# Patient Record
Sex: FEMALE | Race: WHITE | NOT HISPANIC OR LATINO
[De-identification: names, ages, dates, MRNs, and addresses within clinical notes are randomized per-mention and may not be internally consistent; named-entity substitution may affect disease eponyms.]

---

## 2017-01-24 ENCOUNTER — MESSAGE (OUTPATIENT)
Age: 32
End: 2017-01-24

## 2017-03-02 ENCOUNTER — APPOINTMENT (OUTPATIENT)
Dept: NEUROLOGY | Facility: CLINIC | Age: 32
End: 2017-03-02

## 2017-03-02 VITALS
HEART RATE: 83 BPM | OXYGEN SATURATION: 99 % | WEIGHT: 115 LBS | SYSTOLIC BLOOD PRESSURE: 143 MMHG | HEIGHT: 65 IN | BODY MASS INDEX: 19.16 KG/M2 | DIASTOLIC BLOOD PRESSURE: 85 MMHG

## 2017-03-02 DIAGNOSIS — M62.838 OTHER MUSCLE SPASM: ICD-10-CM

## 2017-03-02 DIAGNOSIS — G43.719 CHRONIC MIGRAINE W/OUT AURA, INTRACTABLE, W/OUT STATUS MIGRAINOSUS: ICD-10-CM

## 2017-03-02 DIAGNOSIS — G43.009 MIGRAINE W/OUT AURA, NOT INTRACTABLE, W/OUT STATUS MIGRAINOSUS: ICD-10-CM

## 2017-03-27 ENCOUNTER — APPOINTMENT (OUTPATIENT)
Dept: OBGYN | Facility: CLINIC | Age: 32
End: 2017-03-27

## 2017-03-27 VITALS
HEIGHT: 65 IN | BODY MASS INDEX: 19.99 KG/M2 | DIASTOLIC BLOOD PRESSURE: 62 MMHG | WEIGHT: 120 LBS | SYSTOLIC BLOOD PRESSURE: 102 MMHG

## 2017-03-27 DIAGNOSIS — Z87.42 PERSONAL HISTORY OF OTHER DISEASES OF THE FEMALE GENITAL TRACT: ICD-10-CM

## 2017-03-28 LAB — PROGEST SERPL-MCNC: 33.9 NG/ML

## 2017-03-29 ENCOUNTER — RESULT REVIEW (OUTPATIENT)
Age: 32
End: 2017-03-29

## 2017-03-29 LAB — HCG SERPL-MCNC: NORMAL MIU/ML

## 2017-04-12 ENCOUNTER — APPOINTMENT (OUTPATIENT)
Dept: OBGYN | Facility: CLINIC | Age: 32
End: 2017-04-12

## 2017-04-12 VITALS — WEIGHT: 118.5 LBS | SYSTOLIC BLOOD PRESSURE: 100 MMHG | BODY MASS INDEX: 19.72 KG/M2 | DIASTOLIC BLOOD PRESSURE: 58 MMHG

## 2017-04-12 DIAGNOSIS — Z78.9 OTHER SPECIFIED HEALTH STATUS: ICD-10-CM

## 2017-04-12 DIAGNOSIS — Z82.0 FAMILY HISTORY OF EPILEPSY AND OTHER DISEASES OF THE NERVOUS SYSTEM: ICD-10-CM

## 2017-04-12 LAB
BILIRUB UR QL STRIP: NORMAL
CLARITY UR: CLEAR
COLLECTION METHOD: NORMAL
GLUCOSE UR-MCNC: NORMAL
HCG UR QL: 0.2 EU/DL
HGB UR QL STRIP.AUTO: NORMAL
KETONES UR-MCNC: NORMAL
LEUKOCYTE ESTERASE UR QL STRIP: NORMAL
NITRITE UR QL STRIP: NORMAL
PH UR STRIP: 5.5
PROT UR STRIP-MCNC: NORMAL
SP GR UR STRIP: 1.02

## 2017-04-12 RX ORDER — ELETRIPTAN HYDROBROMIDE 40 MG/1
40 TABLET, FILM COATED ORAL
Qty: 1 | Refills: 3 | Status: DISCONTINUED | COMMUNITY
Start: 2017-03-02 | End: 2017-04-12

## 2017-04-12 RX ORDER — CIPROFLOXACIN 3 MG/ML
0.3 SOLUTION OPHTHALMIC
Qty: 5 | Refills: 0 | Status: COMPLETED | COMMUNITY
Start: 2017-01-24

## 2017-04-12 RX ORDER — AMOXICILLIN 875 MG/1
875 TABLET, FILM COATED ORAL
Qty: 20 | Refills: 0 | Status: COMPLETED | COMMUNITY
Start: 2016-12-12

## 2017-04-12 RX ORDER — AZITHROMYCIN 250 MG/1
250 TABLET, FILM COATED ORAL
Qty: 6 | Refills: 0 | Status: COMPLETED | COMMUNITY
Start: 2017-01-24

## 2017-04-14 LAB
ABO + RH PNL BLD: NORMAL
APPEARANCE: CLEAR
BACTERIA UR CULT: NORMAL
BACTERIA: NEGATIVE
BASOPHILS # BLD AUTO: 0.03 K/UL
BASOPHILS NFR BLD AUTO: 0.3 %
BILIRUBIN URINE: NEGATIVE
BLD GP AB SCN SERPL QL: NORMAL
BLOOD URINE: NEGATIVE
C TRACH RRNA SPEC QL NAA+PROBE: NORMAL
CANDIDA VAG CYTO: NOT DETECTED
CMV IGG SERPL QL: 3.1 U/ML
CMV IGG SERPL-IMP: POSITIVE
CMV IGM SERPL QL: <8 AU/ML
CMV IGM SERPL QL: NEGATIVE
COLOR: YELLOW
EOSINOPHIL # BLD AUTO: 0.17 K/UL
EOSINOPHIL NFR BLD AUTO: 1.5 %
G VAGINALIS+PREV SP MTYP VAG QL MICRO: NOT DETECTED
GLUCOSE QUALITATIVE U: NORMAL MG/DL
HBV SURFACE AG SER QL: NONREACTIVE
HCT VFR BLD CALC: 40.4 %
HCV AB SER QL: NONREACTIVE
HCV S/CO RATIO: 0.27 S/CO
HGB A MFR BLD: 97.4 %
HGB A2 MFR BLD: 2.6 %
HGB BLD-MCNC: 13.2 G/DL
HGB FRACT BLD-IMP: NORMAL
HIV1+2 AB SPEC QL IA.RAPID: NONREACTIVE
HPV HIGH+LOW RISK DNA PNL CVX: NEGATIVE
HYALINE CASTS: 0 /LPF
IMM GRANULOCYTES NFR BLD AUTO: 0.2 %
KETONES URINE: NEGATIVE
LEUKOCYTE ESTERASE URINE: NEGATIVE
LYMPHOCYTES # BLD AUTO: 2.18 K/UL
LYMPHOCYTES NFR BLD AUTO: 19.2 %
MAN DIFF?: NORMAL
MCHC RBC-ENTMCNC: 29.7 PG
MCHC RBC-ENTMCNC: 32.7 GM/DL
MCV RBC AUTO: 90.8 FL
MEV IGG FLD QL IA: 127 AU/ML
MEV IGG+IGM SER-IMP: POSITIVE
MICROSCOPIC-UA: NORMAL
MONOCYTES # BLD AUTO: 0.78 K/UL
MONOCYTES NFR BLD AUTO: 6.9 %
N GONORRHOEA RRNA SPEC QL NAA+PROBE: NORMAL
NEUTROPHILS # BLD AUTO: 8.16 K/UL
NEUTROPHILS NFR BLD AUTO: 71.9 %
NITRITE URINE: NEGATIVE
PH URINE: 6
PLATELET # BLD AUTO: 270 K/UL
PROTEIN URINE: NEGATIVE MG/DL
RBC # BLD: 4.45 M/UL
RBC # FLD: 13.8 %
RED BLOOD CELLS URINE: 3 /HPF
RPR SER-TITR: NORMAL
RUBV IGG FLD-ACNC: 1.4 INDEX
RUBV IGG SER-IMP: POSITIVE
RUBV IGM FLD-ACNC: <20 AU/ML
SOURCE TP AMPLIFICATION: NORMAL
SPECIFIC GRAVITY URINE: 1.02
SQUAMOUS EPITHELIAL CELLS: 1 /HPF
T GONDII AB SER-IMP: NEGATIVE
T GONDII AB SER-IMP: NEGATIVE
T GONDII IGG SER QL: <3 IU/ML
T GONDII IGM SER QL: <3 AU/ML
T VAGINALIS VAG QL WET PREP: NOT DETECTED
T4 SERPL-MCNC: 11.2 UG/DL
TSH SERPL-ACNC: 0.54 UIU/ML
UROBILINOGEN URINE: NORMAL MG/DL
VZV AB TITR SER: POSITIVE
VZV IGG SER IF-ACNC: 254.6 INDEX
WBC # FLD AUTO: 11.34 K/UL
WHITE BLOOD CELLS URINE: 0 /HPF

## 2017-04-15 LAB — BACTERIA GENITAL AEROBE CULT: NORMAL

## 2017-04-18 ENCOUNTER — MESSAGE (OUTPATIENT)
Age: 32
End: 2017-04-18

## 2017-04-18 LAB — CYTOLOGY CVX/VAG DOC THIN PREP: NORMAL

## 2017-04-25 LAB — CFTR MUT TESTED BLD/T: NORMAL

## 2017-04-26 ENCOUNTER — ASOB RESULT (OUTPATIENT)
Age: 32
End: 2017-04-26

## 2017-04-26 ENCOUNTER — APPOINTMENT (OUTPATIENT)
Dept: ANTEPARTUM | Facility: CLINIC | Age: 32
End: 2017-04-26

## 2017-05-10 ENCOUNTER — APPOINTMENT (OUTPATIENT)
Dept: OBGYN | Facility: CLINIC | Age: 32
End: 2017-05-10

## 2017-05-10 VITALS — WEIGHT: 120 LBS | BODY MASS INDEX: 19.97 KG/M2 | DIASTOLIC BLOOD PRESSURE: 72 MMHG | SYSTOLIC BLOOD PRESSURE: 110 MMHG

## 2017-05-10 DIAGNOSIS — Z3A.14 14 WEEKS GESTATION OF PREGNANCY: ICD-10-CM

## 2017-05-15 ENCOUNTER — LABORATORY RESULT (OUTPATIENT)
Age: 32
End: 2017-05-15

## 2017-05-16 ENCOUNTER — APPOINTMENT (OUTPATIENT)
Dept: OBGYN | Facility: CLINIC | Age: 32
End: 2017-05-16

## 2017-05-16 VITALS
HEIGHT: 65 IN | BODY MASS INDEX: 20.33 KG/M2 | WEIGHT: 122 LBS | SYSTOLIC BLOOD PRESSURE: 110 MMHG | DIASTOLIC BLOOD PRESSURE: 60 MMHG

## 2017-05-16 DIAGNOSIS — Z86.19 PERSONAL HISTORY OF OTHER INFECTIOUS AND PARASITIC DISEASES: ICD-10-CM

## 2017-05-17 LAB
APPEARANCE: CLEAR
BILIRUBIN URINE: NEGATIVE
BLOOD URINE: NEGATIVE
CANDIDA VAG CYTO: NOT DETECTED
COLOR: YELLOW
G VAGINALIS+PREV SP MTYP VAG QL MICRO: NOT DETECTED
GLUCOSE QUALITATIVE U: NORMAL MG/DL
KETONES URINE: NEGATIVE
LEUKOCYTE ESTERASE URINE: ABNORMAL
NITRITE URINE: NEGATIVE
PH URINE: 6
PROTEIN URINE: NEGATIVE MG/DL
SPECIFIC GRAVITY URINE: 1.01
T VAGINALIS VAG QL WET PREP: NOT DETECTED
UROBILINOGEN URINE: NORMAL MG/DL

## 2017-05-19 ENCOUNTER — RESULT REVIEW (OUTPATIENT)
Age: 32
End: 2017-05-19

## 2017-05-25 LAB — BACTERIA UR CULT: NORMAL

## 2017-05-31 ENCOUNTER — APPOINTMENT (OUTPATIENT)
Dept: OBGYN | Facility: CLINIC | Age: 32
End: 2017-05-31

## 2017-05-31 VITALS — BODY MASS INDEX: 20.63 KG/M2 | SYSTOLIC BLOOD PRESSURE: 96 MMHG | DIASTOLIC BLOOD PRESSURE: 68 MMHG | WEIGHT: 124 LBS

## 2017-06-01 ENCOUNTER — INPATIENT (INPATIENT)
Facility: HOSPITAL | Age: 32
LOS: 1 days | Discharge: HOME | End: 2017-06-03
Attending: INTERNAL MEDICINE | Admitting: INTERNAL MEDICINE
Payer: COMMERCIAL

## 2017-06-01 VITALS — HEIGHT: 65 IN

## 2017-06-01 LAB
ALBUMIN SERPL ELPH-MCNC: 3.5 G/DL — SIGNIFICANT CHANGE UP (ref 3.3–5)
ALP SERPL-CCNC: 60 U/L — SIGNIFICANT CHANGE UP (ref 40–120)
ALT FLD-CCNC: 9 U/L — LOW (ref 12–78)
ANION GAP SERPL CALC-SCNC: 10 MMOL/L — SIGNIFICANT CHANGE UP (ref 5–17)
APPEARANCE UR: CLEAR — SIGNIFICANT CHANGE UP
AST SERPL-CCNC: 14 U/L — LOW (ref 15–37)
BACTERIA # UR AUTO: (no result)
BASOPHILS # BLD AUTO: 0.1 K/UL — SIGNIFICANT CHANGE UP (ref 0–0.2)
BASOPHILS NFR BLD AUTO: 0.4 % — SIGNIFICANT CHANGE UP (ref 0–2)
BILIRUB SERPL-MCNC: 0.6 MG/DL — SIGNIFICANT CHANGE UP (ref 0.2–1.2)
BILIRUB UR-MCNC: NEGATIVE — SIGNIFICANT CHANGE UP
BUN SERPL-MCNC: 8 MG/DL — SIGNIFICANT CHANGE UP (ref 7–23)
CALCIUM SERPL-MCNC: 9.1 MG/DL — SIGNIFICANT CHANGE UP (ref 8.5–10.1)
CHLORIDE SERPL-SCNC: 103 MMOL/L — SIGNIFICANT CHANGE UP (ref 96–108)
CO2 SERPL-SCNC: 21 MMOL/L — LOW (ref 22–31)
COLOR SPEC: YELLOW — SIGNIFICANT CHANGE UP
CREAT SERPL-MCNC: 0.69 MG/DL — SIGNIFICANT CHANGE UP (ref 0.5–1.3)
DIFF PNL FLD: NEGATIVE — SIGNIFICANT CHANGE UP
EOSINOPHIL # BLD AUTO: 0 K/UL — SIGNIFICANT CHANGE UP (ref 0–0.5)
EOSINOPHIL NFR BLD AUTO: 0.1 % — SIGNIFICANT CHANGE UP (ref 0–6)
EPI CELLS # UR: (no result)
GLUCOSE SERPL-MCNC: 98 MG/DL — SIGNIFICANT CHANGE UP (ref 70–99)
GLUCOSE UR QL: NEGATIVE MG/DL — SIGNIFICANT CHANGE UP
HCG SERPL-ACNC: SIGNIFICANT CHANGE UP MIU/ML
HCT VFR BLD CALC: 39.1 % — SIGNIFICANT CHANGE UP (ref 34.5–45)
HGB BLD-MCNC: 13.6 G/DL — SIGNIFICANT CHANGE UP (ref 11.5–15.5)
KETONES UR-MCNC: (no result)
LACTATE SERPL-SCNC: 0.9 MMOL/L — SIGNIFICANT CHANGE UP (ref 0.7–2)
LEUKOCYTE ESTERASE UR-ACNC: (no result)
LYMPHOCYTES # BLD AUTO: 0.6 K/UL — LOW (ref 1–3.3)
LYMPHOCYTES # BLD AUTO: 3.4 % — LOW (ref 13–44)
MCHC RBC-ENTMCNC: 30.3 PG — SIGNIFICANT CHANGE UP (ref 27–34)
MCHC RBC-ENTMCNC: 34.9 GM/DL — SIGNIFICANT CHANGE UP (ref 32–36)
MCV RBC AUTO: 87 FL — SIGNIFICANT CHANGE UP (ref 80–100)
MONOCYTES # BLD AUTO: 0.3 K/UL — SIGNIFICANT CHANGE UP (ref 0–0.9)
MONOCYTES NFR BLD AUTO: 1.9 % — LOW (ref 2–14)
NEUTROPHILS # BLD AUTO: 15.7 K/UL — HIGH (ref 1.8–7.4)
NEUTROPHILS NFR BLD AUTO: 94.2 % — HIGH (ref 43–77)
NITRITE UR-MCNC: NEGATIVE — SIGNIFICANT CHANGE UP
PH UR: 6 — SIGNIFICANT CHANGE UP (ref 5–8)
PLATELET # BLD AUTO: 223 K/UL — SIGNIFICANT CHANGE UP (ref 150–400)
POTASSIUM SERPL-MCNC: 3.4 MMOL/L — LOW (ref 3.5–5.3)
POTASSIUM SERPL-SCNC: 3.4 MMOL/L — LOW (ref 3.5–5.3)
PROT SERPL-MCNC: 7.3 GM/DL — SIGNIFICANT CHANGE UP (ref 6–8.3)
PROT UR-MCNC: NEGATIVE MG/DL — SIGNIFICANT CHANGE UP
RAPID RVP RESULT: SIGNIFICANT CHANGE UP
RBC # BLD: 4.5 M/UL — SIGNIFICANT CHANGE UP (ref 3.8–5.2)
RBC # FLD: 11.6 % — SIGNIFICANT CHANGE UP (ref 10.3–14.5)
RBC CASTS # UR COMP ASSIST: (no result) /HPF (ref 0–4)
S PYO AG SPEC QL IA: NEGATIVE — SIGNIFICANT CHANGE UP
SODIUM SERPL-SCNC: 134 MMOL/L — LOW (ref 135–145)
SP GR SPEC: 1.02 — SIGNIFICANT CHANGE UP (ref 1.01–1.02)
UROBILINOGEN FLD QL: NEGATIVE MG/DL — SIGNIFICANT CHANGE UP
WBC # BLD: 16.7 K/UL — HIGH (ref 3.8–10.5)
WBC # FLD AUTO: 16.7 K/UL — HIGH (ref 3.8–10.5)
WBC UR QL: SIGNIFICANT CHANGE UP

## 2017-06-01 PROCEDURE — 93010 ELECTROCARDIOGRAM REPORT: CPT

## 2017-06-01 PROCEDURE — 71010: CPT | Mod: 26

## 2017-06-01 RX ORDER — SODIUM CHLORIDE 9 MG/ML
3 INJECTION INTRAMUSCULAR; INTRAVENOUS; SUBCUTANEOUS ONCE
Qty: 0 | Refills: 0 | Status: COMPLETED | OUTPATIENT
Start: 2017-06-01 | End: 2017-06-01

## 2017-06-01 RX ORDER — SODIUM CHLORIDE 9 MG/ML
500 INJECTION INTRAMUSCULAR; INTRAVENOUS; SUBCUTANEOUS
Qty: 0 | Refills: 0 | Status: COMPLETED | OUTPATIENT
Start: 2017-06-01 | End: 2017-06-01

## 2017-06-01 RX ORDER — ONDANSETRON 8 MG/1
4 TABLET, FILM COATED ORAL ONCE
Qty: 0 | Refills: 0 | Status: COMPLETED | OUTPATIENT
Start: 2017-06-01 | End: 2017-06-01

## 2017-06-01 RX ORDER — CEFTRIAXONE 500 MG/1
1 INJECTION, POWDER, FOR SOLUTION INTRAMUSCULAR; INTRAVENOUS ONCE
Qty: 0 | Refills: 0 | Status: COMPLETED | OUTPATIENT
Start: 2017-06-01 | End: 2017-06-01

## 2017-06-01 RX ORDER — ACETAMINOPHEN 500 MG
1000 TABLET ORAL ONCE
Qty: 0 | Refills: 0 | Status: COMPLETED | OUTPATIENT
Start: 2017-06-01 | End: 2017-06-01

## 2017-06-01 RX ORDER — ACETAMINOPHEN 500 MG
975 TABLET ORAL ONCE
Qty: 0 | Refills: 0 | Status: COMPLETED | OUTPATIENT
Start: 2017-06-01 | End: 2017-06-01

## 2017-06-01 RX ADMIN — SODIUM CHLORIDE 3 MILLILITER(S): 9 INJECTION INTRAMUSCULAR; INTRAVENOUS; SUBCUTANEOUS at 19:46

## 2017-06-01 RX ADMIN — ONDANSETRON 4 MILLIGRAM(S): 8 TABLET, FILM COATED ORAL at 20:05

## 2017-06-01 RX ADMIN — SODIUM CHLORIDE 2000 MILLILITER(S): 9 INJECTION INTRAMUSCULAR; INTRAVENOUS; SUBCUTANEOUS at 19:46

## 2017-06-01 RX ADMIN — SODIUM CHLORIDE 2000 MILLILITER(S): 9 INJECTION INTRAMUSCULAR; INTRAVENOUS; SUBCUTANEOUS at 19:45

## 2017-06-01 RX ADMIN — SODIUM CHLORIDE 2000 MILLILITER(S): 9 INJECTION INTRAMUSCULAR; INTRAVENOUS; SUBCUTANEOUS at 19:47

## 2017-06-01 RX ADMIN — SODIUM CHLORIDE 2000 MILLILITER(S): 9 INJECTION INTRAMUSCULAR; INTRAVENOUS; SUBCUTANEOUS at 21:15

## 2017-06-01 RX ADMIN — CEFTRIAXONE 100 GRAM(S): 500 INJECTION, POWDER, FOR SOLUTION INTRAMUSCULAR; INTRAVENOUS at 19:44

## 2017-06-01 RX ADMIN — Medication 975 MILLIGRAM(S): at 20:04

## 2017-06-01 NOTE — ED ADULT NURSE NOTE - OBJECTIVE STATEMENT
Code sepsis. Per patient, she had a sore throat yesterday, today vomiting x 4 times, unable to hold food or fluids down. Pregnant, almost 18 weeks, 2nd pregnancy. No previous medical hx. Febrile, tachycardic. Cardiac monitoring in progress.

## 2017-06-01 NOTE — ED PROVIDER NOTE - OBJECTIVE STATEMENT
30 y/o female w/ hx of migraines, approximately 17 weeks gestation, presents to the ED for vomiting with onset in the past few hours. C/o sore throat with swollen glands a few days ago. Currently denies sore throat. Today, pt developed migraine, lower back pain, fever, n/v, chills. She took Tylenol about 2 hours ago however states she vomited shortly after. Denies daily medications, dysuria, urinary frequency, leg pain, chest pain, shortness of breath. Pt's 3 year old daughter has been vomiting today as well. JUNIOR is Dr. Bacon. Dr. Linares (PCP). 30 y/o female w/ hx of migraines, approximately 17 weeks gestation, presents to the ED for vomiting with onset in the past few hours. C/o sore throat with swollen glands a few days ago. Currently denies sore throat, abd pain or diarrhea. Today, pt developed migraine, lower back pain, fever, n/v, chills. She took Tylenol about 2 hours ago however states she vomited shortly after. Denies daily medications, dysuria, urinary frequency, leg pain, chest pain, shortness of breath. Pt's 3 year old daughter has been vomiting today as well. JUNIOR is Dr. Bacon. Dr. Linares (PCP).

## 2017-06-01 NOTE — ED PROVIDER NOTE - NS ED MD SCRIBE ATTENDING SCRIBE SECTIONS
DISPOSITION/PROGRESS NOTE/CONSULTATIONS/SHIFT CHANGE/PHYSICAL EXAM/PAST MEDICAL/SURGICAL/SOCIAL HISTORY/REVIEW OF SYSTEMS/HISTORY OF PRESENT ILLNESS/RESULTS

## 2017-06-01 NOTE — ED ADULT TRIAGE NOTE - CHIEF COMPLAINT QUOTE
c/o nausea and vomiting since saturday, fever today, was told to come to ER by dr. parikh, pt 17 weeks pregnant

## 2017-06-01 NOTE — ED STATDOCS - PROGRESS NOTE DETAILS
Casandra Carey, on behalf of Attending Dr. Serna. 30 y/o female w/ hx of migraines about 17 weeks pregnant presents to the ED for vomiting with onset in the past few hours. C/o sore throat a few days ago. Today had migraine, fever, n/v, chills. Took Tylenol about 2 hours ago however states she vomited shortly after. C/o abd discomfort and back pain. Denies daily medications, leg pain, chest pain, shortness of breath. OBGYN is Dr. Bacon. HR is 140's patient sent to the Main ED for further Evaluation.

## 2017-06-01 NOTE — ED ADULT NURSE REASSESSMENT NOTE - NS ED NURSE REASSESS COMMENT FT1
Patient continues tachycardic, febrile. Patient placed on cooling blanket. RVP sent as ordered. Cardiac monitoring in progress.

## 2017-06-02 DIAGNOSIS — G43.909 MIGRAINE, UNSPECIFIED, NOT INTRACTABLE, WITHOUT STATUS MIGRAINOSUS: ICD-10-CM

## 2017-06-02 DIAGNOSIS — N39.0 URINARY TRACT INFECTION, SITE NOT SPECIFIED: ICD-10-CM

## 2017-06-02 DIAGNOSIS — B34.9 VIRAL INFECTION, UNSPECIFIED: ICD-10-CM

## 2017-06-02 DIAGNOSIS — A41.9 SEPSIS, UNSPECIFIED ORGANISM: ICD-10-CM

## 2017-06-02 DIAGNOSIS — Z33.1 PREGNANT STATE, INCIDENTAL: ICD-10-CM

## 2017-06-02 DIAGNOSIS — Z29.9 ENCOUNTER FOR PROPHYLACTIC MEASURES, UNSPECIFIED: ICD-10-CM

## 2017-06-02 DIAGNOSIS — Z98.891 HISTORY OF UTERINE SCAR FROM PREVIOUS SURGERY: Chronic | ICD-10-CM

## 2017-06-02 DIAGNOSIS — E87.6 HYPOKALEMIA: ICD-10-CM

## 2017-06-02 LAB
ALBUMIN SERPL ELPH-MCNC: 2.7 G/DL — LOW (ref 3.3–5)
ALP SERPL-CCNC: 48 U/L — SIGNIFICANT CHANGE UP (ref 40–120)
ALT FLD-CCNC: 12 U/L — SIGNIFICANT CHANGE UP (ref 12–78)
ANION GAP SERPL CALC-SCNC: 7 MMOL/L — SIGNIFICANT CHANGE UP (ref 5–17)
APPEARANCE UR: CLEAR — SIGNIFICANT CHANGE UP
APTT BLD: 28 SEC — SIGNIFICANT CHANGE UP (ref 27.5–37.4)
AST SERPL-CCNC: 12 U/L — LOW (ref 15–37)
BASOPHILS # BLD AUTO: 0 K/UL — SIGNIFICANT CHANGE UP (ref 0–0.2)
BASOPHILS # BLD AUTO: 0.1 K/UL — SIGNIFICANT CHANGE UP (ref 0–0.2)
BASOPHILS NFR BLD AUTO: 0.4 % — SIGNIFICANT CHANGE UP (ref 0–2)
BASOPHILS NFR BLD AUTO: 0.5 % — SIGNIFICANT CHANGE UP (ref 0–2)
BILIRUB SERPL-MCNC: 0.4 MG/DL — SIGNIFICANT CHANGE UP (ref 0.2–1.2)
BILIRUB UR-MCNC: NEGATIVE — SIGNIFICANT CHANGE UP
BUN SERPL-MCNC: 5 MG/DL — LOW (ref 7–23)
CALCIUM SERPL-MCNC: 8.2 MG/DL — LOW (ref 8.5–10.1)
CHLORIDE SERPL-SCNC: 112 MMOL/L — HIGH (ref 96–108)
CO2 SERPL-SCNC: 23 MMOL/L — SIGNIFICANT CHANGE UP (ref 22–31)
COLOR SPEC: YELLOW — SIGNIFICANT CHANGE UP
CREAT SERPL-MCNC: 0.62 MG/DL — SIGNIFICANT CHANGE UP (ref 0.5–1.3)
CULTURE RESULTS: NO GROWTH — SIGNIFICANT CHANGE UP
DIFF PNL FLD: NEGATIVE — SIGNIFICANT CHANGE UP
EOSINOPHIL # BLD AUTO: 0 K/UL — SIGNIFICANT CHANGE UP (ref 0–0.5)
EOSINOPHIL # BLD AUTO: 0 K/UL — SIGNIFICANT CHANGE UP (ref 0–0.5)
EOSINOPHIL NFR BLD AUTO: 0.1 % — SIGNIFICANT CHANGE UP (ref 0–6)
EOSINOPHIL NFR BLD AUTO: 0.1 % — SIGNIFICANT CHANGE UP (ref 0–6)
GLUCOSE SERPL-MCNC: 95 MG/DL — SIGNIFICANT CHANGE UP (ref 70–99)
GLUCOSE UR QL: NEGATIVE MG/DL — SIGNIFICANT CHANGE UP
HCT VFR BLD CALC: 33.6 % — LOW (ref 34.5–45)
HCT VFR BLD CALC: 34.8 % — SIGNIFICANT CHANGE UP (ref 34.5–45)
HGB BLD-MCNC: 11.5 G/DL — SIGNIFICANT CHANGE UP (ref 11.5–15.5)
HGB BLD-MCNC: 12.3 G/DL — SIGNIFICANT CHANGE UP (ref 11.5–15.5)
INR BLD: 1.07 RATIO — SIGNIFICANT CHANGE UP (ref 0.88–1.16)
KETONES UR-MCNC: (no result)
LEUKOCYTE ESTERASE UR-ACNC: NEGATIVE — SIGNIFICANT CHANGE UP
LYMPHOCYTES # BLD AUTO: 1.1 K/UL — SIGNIFICANT CHANGE UP (ref 1–3.3)
LYMPHOCYTES # BLD AUTO: 1.1 K/UL — SIGNIFICANT CHANGE UP (ref 1–3.3)
LYMPHOCYTES # BLD AUTO: 9.3 % — LOW (ref 13–44)
LYMPHOCYTES # BLD AUTO: 9.4 % — LOW (ref 13–44)
MAGNESIUM SERPL-MCNC: 2.1 MG/DL — SIGNIFICANT CHANGE UP (ref 1.6–2.6)
MCHC RBC-ENTMCNC: 30.4 PG — SIGNIFICANT CHANGE UP (ref 27–34)
MCHC RBC-ENTMCNC: 31.9 PG — SIGNIFICANT CHANGE UP (ref 27–34)
MCHC RBC-ENTMCNC: 34.1 GM/DL — SIGNIFICANT CHANGE UP (ref 32–36)
MCHC RBC-ENTMCNC: 35.2 GM/DL — SIGNIFICANT CHANGE UP (ref 32–36)
MCV RBC AUTO: 89.3 FL — SIGNIFICANT CHANGE UP (ref 80–100)
MCV RBC AUTO: 90.5 FL — SIGNIFICANT CHANGE UP (ref 80–100)
MONOCYTES # BLD AUTO: 0.7 K/UL — SIGNIFICANT CHANGE UP (ref 0–0.9)
MONOCYTES # BLD AUTO: 0.7 K/UL — SIGNIFICANT CHANGE UP (ref 0–0.9)
MONOCYTES NFR BLD AUTO: 5.7 % — SIGNIFICANT CHANGE UP (ref 2–14)
MONOCYTES NFR BLD AUTO: 5.9 % — SIGNIFICANT CHANGE UP (ref 2–14)
NEUTROPHILS # BLD AUTO: 10.1 K/UL — HIGH (ref 1.8–7.4)
NEUTROPHILS # BLD AUTO: 9.7 K/UL — HIGH (ref 1.8–7.4)
NEUTROPHILS NFR BLD AUTO: 84.2 % — HIGH (ref 43–77)
NEUTROPHILS NFR BLD AUTO: 84.4 % — HIGH (ref 43–77)
NITRITE UR-MCNC: NEGATIVE — SIGNIFICANT CHANGE UP
PH UR: 7 — SIGNIFICANT CHANGE UP (ref 5–8)
PHOSPHATE SERPL-MCNC: 2.1 MG/DL — LOW (ref 2.5–4.5)
PLATELET # BLD AUTO: 187 K/UL — SIGNIFICANT CHANGE UP (ref 150–400)
PLATELET # BLD AUTO: 190 K/UL — SIGNIFICANT CHANGE UP (ref 150–400)
POTASSIUM SERPL-MCNC: 3.6 MMOL/L — SIGNIFICANT CHANGE UP (ref 3.5–5.3)
POTASSIUM SERPL-SCNC: 3.6 MMOL/L — SIGNIFICANT CHANGE UP (ref 3.5–5.3)
PROT SERPL-MCNC: 5.9 GM/DL — LOW (ref 6–8.3)
PROT UR-MCNC: NEGATIVE MG/DL — SIGNIFICANT CHANGE UP
PROTHROM AB SERPL-ACNC: 11.6 SEC — SIGNIFICANT CHANGE UP (ref 9.8–12.7)
RBC # BLD: 3.76 M/UL — LOW (ref 3.8–5.2)
RBC # BLD: 3.85 M/UL — SIGNIFICANT CHANGE UP (ref 3.8–5.2)
RBC # FLD: 12.2 % — SIGNIFICANT CHANGE UP (ref 10.3–14.5)
RBC # FLD: 12.2 % — SIGNIFICANT CHANGE UP (ref 10.3–14.5)
SODIUM SERPL-SCNC: 142 MMOL/L — SIGNIFICANT CHANGE UP (ref 135–145)
SP GR SPEC: 1 — LOW (ref 1.01–1.02)
SPECIMEN SOURCE: SIGNIFICANT CHANGE UP
UROBILINOGEN FLD QL: NEGATIVE MG/DL — SIGNIFICANT CHANGE UP
WBC # BLD: 11.5 K/UL — HIGH (ref 3.8–10.5)
WBC # BLD: 12 K/UL — HIGH (ref 3.8–10.5)
WBC # FLD AUTO: 11.5 K/UL — HIGH (ref 3.8–10.5)
WBC # FLD AUTO: 12 K/UL — HIGH (ref 3.8–10.5)

## 2017-06-02 PROCEDURE — 76815 OB US LIMITED FETUS(S): CPT | Mod: 26

## 2017-06-02 PROCEDURE — 99285 EMERGENCY DEPT VISIT HI MDM: CPT

## 2017-06-02 RX ORDER — ONDANSETRON 8 MG/1
4 TABLET, FILM COATED ORAL ONCE
Qty: 0 | Refills: 0 | Status: COMPLETED | OUTPATIENT
Start: 2017-06-02 | End: 2017-06-02

## 2017-06-02 RX ORDER — POTASSIUM CHLORIDE 20 MEQ
10 PACKET (EA) ORAL
Qty: 0 | Refills: 0 | Status: DISCONTINUED | OUTPATIENT
Start: 2017-06-02 | End: 2017-06-02

## 2017-06-02 RX ORDER — ACETAMINOPHEN 500 MG
650 TABLET ORAL EVERY 4 HOURS
Qty: 0 | Refills: 0 | Status: DISCONTINUED | OUTPATIENT
Start: 2017-06-02 | End: 2017-06-03

## 2017-06-02 RX ORDER — ONDANSETRON 8 MG/1
4 TABLET, FILM COATED ORAL EVERY 6 HOURS
Qty: 0 | Refills: 0 | Status: DISCONTINUED | OUTPATIENT
Start: 2017-06-02 | End: 2017-06-02

## 2017-06-02 RX ORDER — SODIUM CHLORIDE 9 MG/ML
1000 INJECTION INTRAMUSCULAR; INTRAVENOUS; SUBCUTANEOUS
Qty: 0 | Refills: 0 | Status: DISCONTINUED | OUTPATIENT
Start: 2017-06-02 | End: 2017-06-03

## 2017-06-02 RX ORDER — ACETAMINOPHEN 500 MG
650 TABLET ORAL EVERY 6 HOURS
Qty: 0 | Refills: 0 | Status: DISCONTINUED | OUTPATIENT
Start: 2017-06-02 | End: 2017-06-02

## 2017-06-02 RX ORDER — CEFTRIAXONE 500 MG/1
1 INJECTION, POWDER, FOR SOLUTION INTRAMUSCULAR; INTRAVENOUS EVERY 24 HOURS
Qty: 0 | Refills: 0 | Status: DISCONTINUED | OUTPATIENT
Start: 2017-06-02 | End: 2017-06-02

## 2017-06-02 RX ORDER — SODIUM,POTASSIUM PHOSPHATES 278-250MG
1 POWDER IN PACKET (EA) ORAL
Qty: 0 | Refills: 0 | Status: DISCONTINUED | OUTPATIENT
Start: 2017-06-02 | End: 2017-06-03

## 2017-06-02 RX ADMIN — ONDANSETRON 4 MILLIGRAM(S): 8 TABLET, FILM COATED ORAL at 10:58

## 2017-06-02 RX ADMIN — Medication 30 MILLILITER(S): at 00:11

## 2017-06-02 RX ADMIN — Medication 650 MILLIGRAM(S): at 21:30

## 2017-06-02 RX ADMIN — Medication 1 TABLET(S): at 21:30

## 2017-06-02 RX ADMIN — SODIUM CHLORIDE 100 MILLILITER(S): 9 INJECTION INTRAMUSCULAR; INTRAVENOUS; SUBCUTANEOUS at 02:48

## 2017-06-02 RX ADMIN — Medication 1000 MILLIGRAM(S): at 00:11

## 2017-06-02 RX ADMIN — Medication 100 MILLIEQUIVALENT(S): at 02:48

## 2017-06-02 RX ADMIN — Medication 650 MILLIGRAM(S): at 06:10

## 2017-06-02 RX ADMIN — Medication 650 MILLIGRAM(S): at 15:02

## 2017-06-02 RX ADMIN — Medication 650 MILLIGRAM(S): at 10:58

## 2017-06-02 RX ADMIN — SODIUM CHLORIDE 125 MILLILITER(S): 9 INJECTION INTRAMUSCULAR; INTRAVENOUS; SUBCUTANEOUS at 11:04

## 2017-06-02 NOTE — H&P ADULT - ATTENDING COMMENTS
Patient seen and examined after initial evaluation by family medicine resident above. Case discussed and reviewed in detail. Please note my plan below.     32 y/o pregnant female at 17+ weeks w/ PMH of migranes, p/w nausea / vomiting / fever that started acutely yesterday. Patient's 3 year old daughter who goes to , had similar symptoms yesterday with vomiting and fever. Latest vitals patient is afebrile and tachycardia has improved. ED had ordered ceftriaxone for patient.    *Sepsis 2/2 gastroenteritis? vs viral syndrome?  -IVF  -Supportive care  -Advance diet as tolerated    *Abnormal UA   -UA demonstrates bacteriuria in the setting of moderate epithelial cells. Possibly contaminated sample. Will repeat UA  -Trace LE, WBC 3-5 and negative nitrites  -Will consult ID regarding whether patient needs to be treated w/ abx in the setting of bacteriuria w/ moderate epithelial cells    *Pregnancy  -Management as per OB    *DVT ppx  -SCDs Patient seen and examined after initial evaluation by family medicine resident above. Case discussed and reviewed in detail. Please note my plan below.     32 y/o pregnant female at 17+ weeks w/ PMH of migranes, p/w nausea / vomiting / fever that started acutely yesterday. Patient's 3 year old daughter who goes to , had similar symptoms yesterday with vomiting and fever. Latest vitals patient is afebrile and tachycardia has improved. ED had ordered ceftriaxone for patient.    *Sepsis 2/2 gastroenteritis? vs viral syndrome?  -IVF  -Supportive care  -Advance diet as tolerated  -Dehydration - c/w IVF    *Abnormal UA   -UA demonstrates bacteriuria in the setting of moderate epithelial cells. Possibly contaminated sample. Will repeat UA  -Trace LE, WBC 3-5 and negative nitrites  -Will consult ID regarding whether patient needs to be treated w/ abx in the setting of bacteriuria w/ moderate epithelial cells    *Pregnancy  -Management as per OB    *DVT ppx  -SCDs

## 2017-06-02 NOTE — H&P ADULT - NEGATIVE GENERAL GENITOURINARY SYMPTOMS
no urinary hesitancy/no flank pain R/normal urinary frequency/no flank pain L/no hematuria/no dysuria

## 2017-06-02 NOTE — CONSULT NOTE ADULT - SUBJECTIVE AND OBJECTIVE BOX
Patient is a 31y old  Female who presents with a chief complaint of fever, nausea, vomiting (2017 00:38)      HPI:  30 y/o  @ 17 3/ weeks by LMP and a PMHx of of migraines admitted  with c/o nausea/vomiting/headaches/body aches. Pt reports that earlier in the week on tuesday she woke up with sore throat, adenopathy which improved over 24 hours, her 3 year old daughter has been having viral symptoms n/v and has been unwell as well this week too, on wednesday she developed migraine headaches and then started having fever up to 102, here she was febrile to 102.4, wbc ct 16.7, RVP (-), initial UA with 3-5 wbc, trace LE and UA  negative LE and unremarkable otherwise, xray was clear, throat cx negative for strep, she was given 1 dose of rocephin for pyuria. She denies dysuria, no cough, no nasal congestion, no rashes, no abd pain, no diarrhea.       PMH: as above    PSH: as above    Meds: per reconciliation sheet, noted below    MEDICATIONS  (STANDING):  prenatal multivitamin 1Tablet(s) Oral daily  sodium chloride 0.9%. 1000milliLiter(s) IV Continuous <Continuous>      Allergies    No Known Allergies    Intolerances        Social: no smoking, no alcohol, no illegal drugs; no recent travel, no exposure to TB    Family history:  No pertinent family history in first degree relatives      ROS:  no dizziness, no sore throat, no blurry vision, no CP, no palpitations, no SOB, no cough, no abdominal pain, no diarrhea,  no dysuria, no leg pain, no claudication, no rash, no rectal pain or bleeding, no night sweats    Vital Signs Last 24 Hrs  T(C): 37.9, Max: 39.1 ( @ 18:50)  T(F): 100.3, Max: 102.4 ( @ 18:50)  HR: 117 (93 - 137)  BP: 108/64 (97/55 - 121/78)  BP(mean): --  RR: 19 (16 - 22)  SpO2: 99% (97% - 100%)      PE:  Constitutional: NAD  HEENT: NC/AT, EOMI, PERRLA  Neck: supple  Back: no tenderness  Respiratory: clear  Cardiovascular: S1S2 regular, no murmurs  Abdomen: soft, not tender, not distended, positive BS  Genitourinary: deferred  Rectal: deferred  Musculoskeletal: no muscle tenderness, no joint swelling or tenderness  Extremities: no pedal edema  Neurological: AxOx3, moving all extremities, no focal deficits  Skin: no rashes    Labs:                        12.3   11.5  )-----------( 187      ( 2017 10:02 )             34.8     06-02    142  |  112<H>  |  5<L>  ----------------------------<  95  3.6   |  23  |  0.62    Ca    8.2<L>      2017 02:55  Phos  2.1     06-  Mg     2.1     -    TPro  5.9<L>  /  Alb  2.7<L>  /  TBili  0.4  /  DBili  x   /  AST  12<L>  /  ALT  12  /  AlkPhos  48       LIVER FUNCTIONS - ( 2017 02:55 )  Alb: 2.7 g/dL / Pro: 5.9 gm/dL / ALK PHOS: 48 U/L / ALT: 12 U/L / AST: 12 U/L / GGT: x           Urinalysis Basic - ( 2017 06:30 )    Color: Yellow / Appearance: Clear / S.005 / pH: x  Gluc: x / Ketone: Moderate  / Bili: Negative / Urobili: Negative mg/dL   Blood: x / Protein: Negative mg/dL / Nitrite: Negative   Leuk Esterase: Negative / RBC: x / WBC x   Sq Epi: x / Non Sq Epi: x / Bacteria: x            Radiology:   EXAM:  CHEST SINGLE VIEW FRONTAL                            PROCEDURE DATE:  2017        INTERPRETATION:      Views:1  Comparison: Unavailable at this time  History: Fever    The lungs are clear of infiltrates and effusions.     The cardiac and   mediastinal contours appear unremarkable.     Impression:  1. No evidence of acute pulmonary disease.          EXAM:  US OB LIMITED                            PROCEDURE DATE:  2017        INTERPRETATION:  Clinical information: 17 weeks 3 days pregnant with   urosepsis. Evaluate fetus.    Estimated Gestational Age by LMP: 17 weeks 3 days    COMPARISON:None available.    TECHNIQUE: Transabominal pelvic sonogram.     FINDINGS:    Uterus:  Single live intrauterine gestation. The placenta is posterior and without   previa. The cervical length is within normal limits (30 mm) and the   internal os is closed. Amniotic fluid level is appropriate.    The following fetal measurements were recorded:  BPD                               3.8 cm    17 w 4 d  Head circumference       14.8 cm   17 w 6 d  Abdominal circumference 12.3 cm   17 w 6 d  Femur length                  2.5 cm   17 w 3 d  These measurements correlate with an estimated gestational age of 17   weeks 4 days.    Fetal Heart Rate: 161 bpm    IMPRESSION:    Single live intrauterine pregnancy.  Estimated gestational age (based on CRL) of 17 weeks 4 days.  Estimated due date of 2017.    Advanced directives addressed: full resuscitation Patient is a 31y old  Female who presents with a chief complaint of fever, nausea, vomiting (2017 00:38)      HPI:  32 y/o  @ 17 3/ weeks by LMP and a PMHx of of migraines admitted  with c/o nausea/vomiting/headaches/body aches. Pt reports that earlier in the week on tuesday she woke up with sore throat, adenopathy which improved over 24 hours, her 3 year old daughter has been having viral symptoms n/v and has been unwell as well this week too, on wednesday she developed migraine headaches and then started having fever up to 102, here she was febrile to 102.4, wbc ct 16.7, RVP (-), initial UA with 3-5 wbc, trace LE and UA  negative LE and unremarkable otherwise, xray was clear, throat cx negative for strep, she was given 1 dose of rocephin for pyuria. She denies dysuria, no cough, no nasal congestion, no rashes, no abd pain, no diarrhea.       PMH: as above    PSH: as above    Meds: per reconciliation sheet, noted below    MEDICATIONS  (STANDING):  prenatal multivitamin 1Tablet(s) Oral daily  sodium chloride 0.9%. 1000milliLiter(s) IV Continuous <Continuous>      Allergies    No Known Allergies    Intolerances        Social: no smoking, no alcohol, no illegal drugs; no recent travel, no exposure to TB    Family history:  No pertinent family history in first degree relatives      ROS:  no dizziness, no sore throat, no blurry vision, no CP, no palpitations, no SOB, no cough, no abdominal pain, no diarrhea,  no dysuria, no leg pain, no claudication, no rash, no rectal pain or bleeding, no night sweats    Vital Signs Last 24 Hrs  T(C): 37.9, Max: 39.1 ( @ 18:50)  T(F): 100.3, Max: 102.4 ( @ 18:50)  HR: 117 (93 - 137)  BP: 108/64 (97/55 - 121/78)  BP(mean): --  RR: 19 (16 - 22)  SpO2: 99% (97% - 100%)      PE:  Constitutional: NAD  HEENT: NC/AT, EOMI, PERRLA  Neck: supple, no nuchal rigidity  Back: no tenderness  Respiratory: clear  Cardiovascular: S1S2 regular, no murmurs  Abdomen: soft, not tender, not distended, positive BS  Genitourinary: deferred  Rectal: deferred  Musculoskeletal:  no joint swelling or tenderness  Extremities: no pedal edema  Neurological: AxOx3, moving all extremities, no focal deficits  Skin: no rashes    Labs:                        12.3   11.5  )-----------( 187      ( 2017 10:02 )             34.8     06-02    142  |  112<H>  |  5<L>  ----------------------------<  95  3.6   |  23  |  0.62    Ca    8.2<L>      2017 02:55  Phos  2.1     -  Mg     2.1     -    TPro  5.9<L>  /  Alb  2.7<L>  /  TBili  0.4  /  DBili  x   /  AST  12<L>  /  ALT  12  /  AlkPhos  48  -     LIVER FUNCTIONS - ( 2017 02:55 )  Alb: 2.7 g/dL / Pro: 5.9 gm/dL / ALK PHOS: 48 U/L / ALT: 12 U/L / AST: 12 U/L / GGT: x           Urinalysis Basic - ( 2017 06:30 )    Color: Yellow / Appearance: Clear / S.005 / pH: x  Gluc: x / Ketone: Moderate  / Bili: Negative / Urobili: Negative mg/dL   Blood: x / Protein: Negative mg/dL / Nitrite: Negative   Leuk Esterase: Negative / RBC: x / WBC x   Sq Epi: x / Non Sq Epi: x / Bacteria: x            Radiology:   EXAM:  CHEST SINGLE VIEW FRONTAL                            PROCEDURE DATE:  2017        INTERPRETATION:      Views:1  Comparison: Unavailable at this time  History: Fever    The lungs are clear of infiltrates and effusions.     The cardiac and   mediastinal contours appear unremarkable.     Impression:  1. No evidence of acute pulmonary disease.          EXAM:  US OB LIMITED                            PROCEDURE DATE:  2017        INTERPRETATION:  Clinical information: 17 weeks 3 days pregnant with   urosepsis. Evaluate fetus.    Estimated Gestational Age by LMP: 17 weeks 3 days    COMPARISON:None available.    TECHNIQUE: Transabominal pelvic sonogram.     FINDINGS:    Uterus:  Single live intrauterine gestation. The placenta is posterior and without   previa. The cervical length is within normal limits (30 mm) and the   internal os is closed. Amniotic fluid level is appropriate.    The following fetal measurements were recorded:  BPD                               3.8 cm    17 w 4 d  Head circumference       14.8 cm   17 w 6 d  Abdominal circumference 12.3 cm   17 w 6 d  Femur length                  2.5 cm   17 w 3 d  These measurements correlate with an estimated gestational age of 17   weeks 4 days.    Fetal Heart Rate: 161 bpm    IMPRESSION:    Single live intrauterine pregnancy.  Estimated gestational age (based on CRL) of 17 weeks 4 days.  Estimated due date of 2017.    Advanced directives addressed: full resuscitation Patient is a 31y old  Female who presents with a chief complaint of fever, nausea, vomiting (2017 00:38)      HPI:  32 y/o  @ 17 3/ weeks by LMP and a PMHx of of migraines admitted  with c/o nausea/vomiting/headaches/body aches. Pt reports that earlier in the week on tuesday she woke up with sore throat, adenopathy which improved over 24 hours, her 3 year old daughter has been having viral symptoms n/v and has been unwell as well this week too, on wednesday she developed migraine headaches and then started having fever up to 102 with episodes of nausea/vomiting, here she was febrile to 102.4, wbc ct 16.7, RVP (-), initial UA with 3-5 wbc, trace LE and UA / negative LE and unremarkable otherwise, xray was clear, throat cx negative for strep, she was given 1 dose of rocephin for pyuria. She denies dysuria, no cough, no nasal congestion, no rashes, no abd pain, no diarrhea.       PMH: as above    PSH: as above    Meds: per reconciliation sheet, noted below    MEDICATIONS  (STANDING):  prenatal multivitamin 1Tablet(s) Oral daily  sodium chloride 0.9%. 1000milliLiter(s) IV Continuous <Continuous>      Allergies    No Known Allergies    Intolerances        Social: no smoking, no alcohol, no illegal drugs; no recent travel, no exposure to TB    Family history:  No pertinent family history in first degree relatives      ROS:  no dizziness, no sore throat, no blurry vision, no CP, no palpitations, no SOB, no cough, no abdominal pain, no diarrhea,  no dysuria, no leg pain, no claudication, no rash, no rectal pain or bleeding, no night sweats    Vital Signs Last 24 Hrs  T(C): 37.9, Max: 39.1 ( @ 18:50)  T(F): 100.3, Max: 102.4 ( @ 18:50)  HR: 117 (93 - 137)  BP: 108/64 (97/55 - 121/78)  BP(mean): --  RR: 19 (16 - 22)  SpO2: 99% (97% - 100%)      PE:  Constitutional: NAD  HEENT: NC/AT, EOMI, PERRLA  Neck: supple, no nuchal rigidity  Back: no tenderness  Respiratory: clear  Cardiovascular: S1S2 regular, no murmurs  Abdomen: soft, not tender, not distended, positive BS  Genitourinary: deferred  Rectal: deferred  Musculoskeletal:  no joint swelling or tenderness  Extremities: no pedal edema  Neurological: AxOx3, moving all extremities, no focal deficits  Skin: no rashes    Labs:                        12.3   11.5  )-----------( 187      ( 2017 10:02 )             34.8     06-02    142  |  112<H>  |  5<L>  ----------------------------<  95  3.6   |  23  |  0.62    Ca    8.2<L>      2017 02:55  Phos  2.1     06-  Mg     2.1     -    TPro  5.9<L>  /  Alb  2.7<L>  /  TBili  0.4  /  DBili  x   /  AST  12<L>  /  ALT  12  /  AlkPhos  48  06-02     LIVER FUNCTIONS - ( 2017 02:55 )  Alb: 2.7 g/dL / Pro: 5.9 gm/dL / ALK PHOS: 48 U/L / ALT: 12 U/L / AST: 12 U/L / GGT: x           Urinalysis Basic - ( 2017 06:30 )    Color: Yellow / Appearance: Clear / S.005 / pH: x  Gluc: x / Ketone: Moderate  / Bili: Negative / Urobili: Negative mg/dL   Blood: x / Protein: Negative mg/dL / Nitrite: Negative   Leuk Esterase: Negative / RBC: x / WBC x   Sq Epi: x / Non Sq Epi: x / Bacteria: x            Radiology:   EXAM:  CHEST SINGLE VIEW FRONTAL                            PROCEDURE DATE:  2017        INTERPRETATION:      Views:1  Comparison: Unavailable at this time  History: Fever    The lungs are clear of infiltrates and effusions.     The cardiac and   mediastinal contours appear unremarkable.     Impression:  1. No evidence of acute pulmonary disease.          EXAM:  US OB LIMITED                            PROCEDURE DATE:  2017        INTERPRETATION:  Clinical information: 17 weeks 3 days pregnant with   urosepsis. Evaluate fetus.    Estimated Gestational Age by LMP: 17 weeks 3 days    COMPARISON:None available.    TECHNIQUE: Transabominal pelvic sonogram.     FINDINGS:    Uterus:  Single live intrauterine gestation. The placenta is posterior and without   previa. The cervical length is within normal limits (30 mm) and the   internal os is closed. Amniotic fluid level is appropriate.    The following fetal measurements were recorded:  BPD                               3.8 cm    17 w 4 d  Head circumference       14.8 cm   17 w 6 d  Abdominal circumference 12.3 cm   17 w 6 d  Femur length                  2.5 cm   17 w 3 d  These measurements correlate with an estimated gestational age of 17   weeks 4 days.    Fetal Heart Rate: 161 bpm    IMPRESSION:    Single live intrauterine pregnancy.  Estimated gestational age (based on CRL) of 17 weeks 4 days.  Estimated due date of 2017.    Advanced directives addressed: full resuscitation

## 2017-06-02 NOTE — H&P ADULT - PROBLEM SELECTOR PLAN 2
--  BPM  -- OB consult --  BPM  -- continue PNV  -- OB consult - Bacon likely due to GI losses from vomiting  -- give 3 k riders stat  -- f/u BMP in AM likely due to GI losses from vomiting  -- f/u BMP in AM

## 2017-06-02 NOTE — H&P ADULT - ASSESSMENT
32 y/o  @ 17 3/7 weeks by LMP and a PMHx of of migraines presents to the ED for nausea and vomiting X 6 hours accompanied with fever (tmax 102.4), chills, body aches and migraines;

## 2017-06-02 NOTE — H&P ADULT - PROBLEM SELECTOR PLAN 3
-- at baseline -- at baseline  -- tylenol PRN pain --  BPM  -- continue PNV daily  -- OB consult - Bacon

## 2017-06-02 NOTE — PROGRESS NOTE ADULT - SUBJECTIVE AND OBJECTIVE BOX
30 YO  @17w3d GA presented to the ED with nausea and vomiting for 6 hours, accompanied by fever (tmax 102.4), chills, body aches and migraines. Patient reports sore throat and adenopathy 4 days ago which has since resolved. No abdominal pain, diarrhea, dysuria, rhinorrhea, vaginal bleeding, or discharge. .Minimal improvement after taking Tylenol at home. 3 YO daughter had similar symptoms today.       In the ED she was found to be febrile, tachycardic and mildly dirty UA; She received 2L IVNS, 2 rounds of tylenol, and ceftriaxone but continued to have fevers (102.3) and have difficulty tolerating PO.     - Patient seen and examined with  at bedside.  Patient reports that she is feeling much better, nausea and vomiting have resolved but she still has a headache.  Reports feeling thirsty.  Has not had any complications during pregnancy.  Last visit to see Dr. Bacon was on Wednesday.  Patient denies contractions, blood or fluid from vagina.     MEDICATIONS  (STANDING):  prenatal multivitamin 1Tablet(s) Oral daily  sodium chloride 0.9%. 1000milliLiter(s) IV Continuous <Continuous>  potassium chloride  10 mEq/100 mL IVPB 10milliEquivalent(s) IV Intermittent every 1 hour    MEDICATIONS  (PRN):  ondansetron Injectable 4milliGRAM(s) IV Push every 6 hours PRN Nausea and/or Vomiting  acetaminophen   Tablet 650milliGRAM(s) Oral every 6 hours PRN For Temp greater than 38 C (100.4 F)    ICU Vital Signs Last 24 Hrs  T(C): 36.9, Max: 39.1 ( @ 18:50)  T(F): 98.5, Max: 102.4 ( @ 18:50)  HR: 98 (98 - 137)  BP: 107/62 (99/72 - 121/78)  BP(mean): --  ABP: --  ABP(mean): --  RR: 18 (16 - 22)  SpO2: 100% (97% - 100%)    GEN: NAD, comfortable, resting in bed  CV: S1S2, RRR, no mumur  RESP: good air movement, CTABL, no rales, rhonchi or wheezing  ABD: +BS, soft, ND, NT, no guarding, no rigidity  MSK: no CVA tenderness  EXT: +2 radial and pedial pulses, no edema, no calve tenderness                          11.5   12.0  )-----------( 190      ( 2017 02:55 )             33.6     2017 02:55    142    |  112    |  5      ----------------------------<  95     3.6     |  23     |  0.62     Ca    8.2        2017 02:55    TPro  5.9    /  Alb  2.7    /  TBili  0.4    /  DBili  x      /  AST  12     /  ALT  12     /  AlkPhos  48     2017 02:55    LIVER FUNCTIONS - ( 2017 02:55 )  Alb: 2.7 g/dL / Pro: 5.9 gm/dL / ALK PHOS: 48 U/L / ALT: 12 U/L / AST: 12 U/L / GGT: x           PT/INR - ( 2017 02:55 )   PT: 11.6 sec;   INR: 1.07 ratio         PTT - ( 2017 02:55 )  PTT:28.0 sec  CAPILLARY BLOOD GLUCOSE        Urinalysis Basic - ( 2017 20:50 )    Color: Yellow / Appearance: Clear / S.020 / pH: x  Gluc: x / Ketone: Large  / Bili: Negative / Urobili: Negative mg/dL   Blood: x / Protein: Negative mg/dL / Nitrite: Negative   Leuk Esterase: Trace / RBC: 3-5 /HPF / WBC 3-5   Sq Epi: x / Non Sq Epi: Moderate / Bacteria: Many

## 2017-06-02 NOTE — PROGRESS NOTE ADULT - SUBJECTIVE AND OBJECTIVE BOX
31 y.o.  at 17+4 wks GA admitted for fever and persistent nausea and vomiting. Pt seen and examined at bedside. Pt is still nauseous, had one episode of NBNB emesis during encounter. She drank apple juice this morning and had nothing by mouth overnight. Pt endorses chills and is currently febrile. She also endorses bilateral lower back pain. Denies dysuria or vaginal bleeding.     Physical exam:  Vital Signs Last 24 Hrs  T(C): 37.8, Max: 39.1 ( @ 18:50)  T(F): 100.1, Max: 102.4 ( @ 18:50)  HR: 104 (93 - 137)  BP: 97/55 (97/55 - 121/78)  BP(mean): --  RR: 18 (16 - 22)  SpO2: 98% (97% - 100%)    General: Well developed, well nourished, pale   HEENT: dry oral mucous membranes   Respiratory: CTA B/L  CV: RRR, +S1/S2, no M/R/G  Abdominal: Soft, non-tender, bowel sounds present in all 4 quadrants   Extremities: No C/C/E, + peripheral pulses  MSK: Normal ROM, no joint erythema or warmth, no joint swelling   Skin: warm, dry, normal color, no rash or abnormal lesions    Labs:                        12.3   11.5  )-----------( 187      ( 2017 10:02 )             34.8       A/P: 31 y.o.  at 17+4 wks GA admitted for fever and persistent nausea and vomiting.    -UA shows moderate ketones, continue IVF, consider switching to LR   -pt still unable to tolerate PO, zofran PRN for nausea/vomiting  -ob sono normal: single intrauterine pregnancy, size consistent with gestational age, REG wnl   -fever ?secondary to UTI, though UA unimpressive vs ?viral gastro, follow-up ID recommendations, culture results pending 31 y.o.  at 17+4 wks GA admitted for fever and persistent nausea and vomiting. Pt seen and examined at bedside. Pt is still nauseous, had one episode of NBNB emesis during encounter. She drank apple juice this morning and had nothing by mouth overnight. Pt endorses chills and is currently febrile. She also endorses bilateral lower back pain, appears muscular in nature. Denies dysuria or vaginal bleeding.     Physical exam:  Vital Signs Last 24 Hrs  T(C): 37.8, Max: 39.1 ( @ 18:50)  T(F): 100.1, Max: 102.4 ( @ 18:50)  HR: 104 (93 - 137)  BP: 97/55 (97/55 - 121/78)  BP(mean): --  RR: 18 (16 - 22)  SpO2: 98% (97% - 100%)    General: Well developed, pale, weak-appearing  HEENT: dry oral mucous membranes   Respiratory: CTA B/L  CV: RRR, +S1/S2, no M/R/G  Abdominal: Soft, non-tender, bowel sounds present in all 4 quadrants, negative CVAT   Extremities: No C/C/E, + peripheral pulses  MSK: Normal ROM, no joint erythema or warmth, no joint swelling   Skin: warm, dry, pale, no rash or abnormal lesions    Labs:                        12.3   11.5  )-----------( 187      ( 2017 10:02 )             34.8       A/P: 31 y.o.  at 17+4 wks GA admitted for fever and persistent nausea and vomiting.    -UA shows moderate ketones, continue IVF, consider switching to LR   -pt still unable to tolerate PO, zofran PRN for nausea/vomiting  -ob sono normal: single intrauterine pregnancy, size consistent with gestational age, REG wnl   -fever ?secondary to UTI, though UA unimpressive vs ?viral gastro, follow-up ID recommendations, culture results pending

## 2017-06-02 NOTE — H&P ADULT - HISTORY OF PRESENT ILLNESS
32 y/o  @ 17 3/7 weeks by LMP and a PMHx of of migraines presents to the ED for nausea and vomiting X 6 hours accompanied with fever (tmax), chills, body aches and migraines;+sore throat and adenopathy 4 days ago which has since resolved; No abdominal pain, diarrhea, dysuria, rhinorrhea, vaginal bleeding, or discharge. .Minimal improvement after taking Tylenol at home. 3 y/o daughter had similar symptoms today.     In the ED she 32 y/o  @ 17 3/7 weeks by LMP and a PMHx of of migraines presents to the ED for nausea and vomiting X 6 hours accompanied with fever (tmax), chills, body aches and migraines;+sore throat and adenopathy 4 days ago which has since resolved; No abdominal pain, diarrhea, dysuria, rhinorrhea, vaginal bleeding, or discharge. .Minimal improvement after taking Tylenol at home. 3 y/o daughter had similar symptoms today.     In the ED she recieved 2L IVNS, 2 rounds of tylenol, and ceftriaxone but continued to have fevers and have difficulty tolerating PO. 30 y/o  @ 17 3/7 weeks by LMP and a PMHx of of migraines presents to the ED for nausea and vomiting X 6 hours accompanied with fever (tmax 102.4), chills, body aches and migraines;+sore throat and adenopathy 4 days ago which has since resolved; No abdominal pain, diarrhea, dysuria, rhinorrhea, vaginal bleeding, or discharge. .Minimal improvement after taking Tylenol at home. 3 y/o daughter had similar symptoms today.     In the ED she was found to be febrile, tachycardic and mildly dirty UA; She received 2L IVNS, 2 rounds of tylenol, and ceftriaxone but continued to have fevers (102.3) and have difficulty tolerating PO. 32 y/o  @ 17 3/7 weeks by LMP and a PMHx of of migraines presents to the ED for nausea and vomiting X 6 hours accompanied with fever (tmax 102.4), chills, body aches and migraines; +sore throat and adenopathy 4 days ago which has since resolved; No abdominal pain, diarrhea, dysuria, rhinorrhea, vaginal bleeding, or discharge. .Minimal improvement after taking Tylenol at home. 3 y/o daughter had similar symptoms today.     In the ED she was found to be febrile, tachycardic and mildly dirty UA; She received 2L IVNS, 2 rounds of tylenol, and ceftriaxone but continued to have fevers (102.3) and have difficulty tolerating PO.

## 2017-06-02 NOTE — PROGRESS NOTE ADULT - NEGATIVE GASTROINTESTINAL SYMPTOMS
no change in bowel habits/no abdominal pain/no diarrhea/no melena/no hematochezia/no jaundice/no constipation

## 2017-06-02 NOTE — H&P ADULT - PROBLEM SELECTOR PLAN 4
-- venodynes  -- ambulating -- venodynes for DVT PPx  -- ambulating -- at baseline  -- tylenol PRN pain

## 2017-06-02 NOTE — PROVIDER CONTACT NOTE (OTHER) - NAME OF MD/NP/PA/DO NOTIFIED:
INFECTIOUS DISEASE, DR. CUNHA RECEIVING CONSULT.
MD PADMINI ON UNIT NOW. WILL SEE PATIENT.
Spoke with Mariza from answering service

## 2017-06-02 NOTE — PROGRESS NOTE ADULT - NEGATIVE ENMT SYMPTOMS
no post-nasal discharge/no recurrent cold sores/no nasal congestion/no sinus symptoms/no ear pain/no throat pain/no nasal discharge

## 2017-06-02 NOTE — H&P ADULT - PROBLEM SELECTOR PROBLEM 4
Prophylactic measure Migraine without status migrainosus, not intractable, unspecified migraine type

## 2017-06-02 NOTE — H&P ADULT - PROBLEM SELECTOR PLAN 1
-- admit to medial floor  -- UA shows mild LE, f/u UCx  -- CXR shows NAD  -- WBC, f/u in AM  -- Lactate WNL  -- check RVP & BCX x 2  -- s/p @L IVNS in ED  -- zofran q6H sepsis in context of pregnancy  -- admit to [facility] floor  -- UA shows mild LE, f/u UCx  -- RVP negative  -- rapid strep negative, f/u throat Cx  -- CXR shows NAD  -- WBC 16K with left shift, f/u CBC in AM  -- Lactate WNL  -- check RVP & BCX x 2  -- s/p 2L IVNS in ED  -- zofran q6H sepsis in context of pregnancy  -- admit to [place] floor  -- UA shows mild LE, f/u UCx  -- RVP negative, f/u BCx  -- rapid strep negative, f/u throat Cx  -- CXR shows NAD  -- WBC 16K with left shift, f/u CBC in AM  -- Lactate 0.9, WNL  -- check RVP & BCX x 2  -- s/p 2L IVNS in ED  -- zofran q6H sepsis in context of pregnancy  -- admit to medical floor  -- UA shows mild LE, f/u UCx  -- RVP negative, f/u BCx  -- rapid strep negative, f/u throat Cx  -- CXR shows NAD  -- WBC 16K with left shift, f/u CBC in AM  -- Lactate 0.9, WNL  -- f/u BCX x 2  -- s/p 2L IVNS in ED, continue IVNS @ 100 cc/hr  -- keep NPO while vomiting, may advance as tolerated  -- zofran q6H PRN  -- tylenol PRN fever sepsis in context of pregnancy; DDx include gastroenteritis  -- admit to medical floor  -- UA shows mild LE, f/u UCx  -- RVP negative, f/u BCx  -- rapid strep negative, f/u throat Cx  -- CXR shows NAD  -- WBC 16K with left shift, f/u CBC in AM  -- Lactate 0.9, WNL  -- f/u BCX x 2  -- s/p 2L IVNS in ED, continue IVNS @ 100 cc/hr  -- rec'd ceftriaxone in ED, will continue PO macrobid for now  -- keep NPO while vomiting, may advance as tolerated  -- zofran q6H PRN  -- tylenol PRN fever sepsis in context of pregnancy; DDx include gastroenteritis  -- admit to medical floor  -- UA shows mild LE, f/u UCx  -- RVP negative, f/u BCx  -- rapid strep negative, f/u throat Cx  -- CXR shows NAD  -- WBC 16K with left shift, f/u CBC in AM  -- Lactate 0.9, WNL  -- f/u BCX x 2  -- s/p 2L IVNS in ED, continue IVNS @ 100 cc/hr  -- rec'd ceftriaxone in ED  -- may advance diet as tolerated  -- zofran q6H PRN  -- tylenol PRN fever with leukocytosis, fever, tachycardia  DDx include gastroenteritis vs viral syndrome vs UTI  -- admit to medical floor  -- UA shows mild LE, but many epithelial cells, contaminated?, f/u UCx  -- RVP negative, f/u BCx x 2  -- rapid strep negative, f/u throat Cx  -- CXR shows NAD  -- WBC 16K with left shift, f/u CBC in AM  -- Lactate 0.9, WNL  -- s/p 2L IVNS in ED, continue IVNS @ 125 cc/hr  -- rec'd ceftriaxone in ED, will observe off ABx for now  -- ID consult - Ana per Dr. Guy for tx recommendation for asymptomatic bacteruria in contaminated UA  -- may advance diet as tolerated  -- no longer nausea, will d/c zofran per Dr. Guy  -- tylenol PRN fever with leukocytosis, fever, tachycardia  DDx include gastroenteritis vs viral syndrome   -- admit to medical floor  -- UA shows mild LE, but many epithelial cells, contaminated?, f/u UCx  -- RVP negative, f/u BCx x 2  -- rapid strep negative, f/u throat Cx  -- CXR shows NAD  -- WBC 16K with left shift, f/u CBC in AM  -- Lactate 0.9, WNL  -- s/p 2L IVNS in ED, continue IVNS @ 125 cc/hr  -- rec'd ceftriaxone in ED, will observe off ABx for now  -- ID consult - Ana per Dr. Guy for tx recommendation for asymptomatic bacteruria in contaminated UA  -- may advance diet as tolerated  -- no longer nausea, will d/c zofran per Dr. Guy  -- tylenol PRN fever

## 2017-06-02 NOTE — H&P ADULT - NEGATIVE GASTROINTESTINAL SYMPTOMS
no abdominal pain/no jaundice/no change in bowel habits/no melena/no constipation/no hematochezia/no diarrhea

## 2017-06-02 NOTE — H&P ADULT - NEGATIVE ENMT SYMPTOMS
no recurrent cold sores/no nasal congestion/no throat pain/no ear pain/no post-nasal discharge/no sinus symptoms/no nasal discharge

## 2017-06-02 NOTE — H&P ADULT - NSHPPHYSICALEXAM_GEN_ALL_CORE
Vital Signs Last 24 Hrs  T(C): 36.9, Max: 39.1 (06-01 @ 18:50)  T(F): 98.5, Max: 102.4 (06-01 @ 18:50)  HR: 102 (102 - 137)  BP: 106/65 (104/64 - 121/78)  BP(mean): --  RR: 16 (16 - 22)  SpO2: 100% (97% - 100%)

## 2017-06-02 NOTE — PROGRESS NOTE ADULT - NEGATIVE GENERAL GENITOURINARY SYMPTOMS
no dysuria/no hematuria/normal urinary frequency/no flank pain R/no flank pain L/no urinary hesitancy

## 2017-06-02 NOTE — PROGRESS NOTE ADULT - SUBJECTIVE AND OBJECTIVE BOX
HPI:  30 y/o  @ 17 3/7 weeks by LMP and a PMHx of of migraines presents to the ED for nausea and vomiting X 6 hours accompanied with fever (tmax 102.4), chills, body aches and migraines; +sore throat and adenopathy 4 days ago which has since resolved; No abdominal pain, diarrhea, dysuria, rhinorrhea, vaginal bleeding, or discharge. .Minimal improvement after taking Tylenol at home. 3 y/o daughter had similar symptoms today.     In the ED she was found to be febrile, tachycardic and mildly dirty UA; She received 2L IVNS, 2 rounds of tylenol, and ceftriaxone but continued to have fevers (102.3) and have difficulty tolerating PO. (2017 00:38)    6/: Pt seen and examined at bedside. Still with fevers, chills, and arthralgias. mild relief with tylenol. mild nausea. no vomiting.  Has not felt baby move throughout pregnancy. Denies cramps/contractions. no blood/fluid from the vagina.

## 2017-06-02 NOTE — H&P ADULT - RS GEN PE MLT RESP DETAILS PC
airway patent/no intercostal retractions/normal/no rales/no rhonchi/breath sounds equal/no chest wall tenderness/good air movement/respirations non-labored/no wheezes/clear to auscultation bilaterally

## 2017-06-02 NOTE — CONSULT NOTE ADULT - ASSESSMENT
32 y/o  @ 17 3/7 weeks by LMP and a PMHx of of migraines admitted  with c/o nausea/vomiting/headaches/body aches. Pt reports that earlier in the week on tuesday she woke up with sore throat, adenopathy which improved over 24 hours, her 3 year old daughter has been having viral symptoms n/v and has been unwell as well this week too, on wednesday she developed migraine headaches and then started having fever up to 102, here she was febrile to 102.4, wbc ct 16.7, RVP (-), initial UA with 3-5 wbc, trace LE and UA  negative LE and unremarkable otherwise, xray was clear, throat cx negative for strep, she was given 1 dose of rocephin for pyuria. She denies dysuria, no cough, no nasal congestion, no rashes, no abd pain, no diarrhea.     1. febrile syndrome/recent pharyngitis/migraine headaches/pregnancy    - daughter at home recently ill and pt with constellation of recent pharyngitis symptoms/body aches/fevers which is concerning for viral etiology    - RVP (-) here, UA initial with pyuria and repeat UA unremarkable     - would hold off on further antibiotics for now    - f/u urine cultures, blood cultures    - check procalcitonin    - check EBV/CMV/parvovirus serology    - if fevers persist with unclear etiology, recommend CT head/chest/abd/pelvis +/- neck to evaluate for source 30 y/o  @ 17 3/7 weeks by LMP and a PMHx of of migraines admitted  with c/o nausea/vomiting/headaches/body aches. Pt reports that earlier in the week on tuesday she woke up with sore throat, adenopathy which improved over 24 hours, her 3 year old daughter has been having viral symptoms n/v and has been unwell as well this week too, on wednesday she developed migraine headaches and then started having fever up to 102, here she was febrile to 102.4, wbc ct 16.7, RVP (-), initial UA with 3-5 wbc, trace LE and UA / negative LE and unremarkable otherwise, xray was clear, throat cx negative for strep, she was given 1 dose of rocephin for pyuria. She denies dysuria, no cough, no nasal congestion, no rashes, no abd pain, no diarrhea.     1. febrile syndrome/recent pharyngitis/migraine headaches/pregnancy    - daughter at home recently ill and pt with constellation of recent pharyngitis symptoms/body aches/fevers which is concerning for viral etiology    - her nausea/vomiting could be related to migraine headaches vs she reports having hx of cyclical vomiting issues in past?    - RVP (-) here, UA initial with pyuria and repeat UA unremarkable     - would hold off on further antibiotics for now    - f/u urine cultures, blood cultures    - check procalcitonin    - check EBV/CMV/parvovirus serology    - if fevers persist with unclear etiology, recommend CT head/chest/abd/pelvis +/- neck to evaluate for source

## 2017-06-03 VITALS
DIASTOLIC BLOOD PRESSURE: 59 MMHG | SYSTOLIC BLOOD PRESSURE: 93 MMHG | HEART RATE: 100 BPM | TEMPERATURE: 98 F | OXYGEN SATURATION: 98 %

## 2017-06-03 LAB
1ST TRIMESTER DATA: NORMAL
2ND TRIMESTER DATA: NORMAL
AFP PNL SERPL: NORMAL
AFP SERPL-ACNC: NORMAL
AFP SERPL-ACNC: NORMAL
B-HCG FREE SERPL-MCNC: NORMAL
BILIRUB UR QL STRIP: NORMAL
CLARITY UR: CLEAR
CLINICAL BIOCHEMIST REVIEW: NORMAL
CMV IGG FLD QL: 1.2 U/ML — HIGH
CMV IGG SERPL-IMP: POSITIVE
CMV IGM FLD-ACNC: <8 AU/ML — SIGNIFICANT CHANGE UP
CMV IGM SERPL QL: NEGATIVE — SIGNIFICANT CHANGE UP
COLLECTION METHOD: NORMAL
CULTURE RESULTS: SIGNIFICANT CHANGE UP
EBV EA AB SER IA-ACNC: 14.2 U/ML — HIGH
EBV EA AB TITR SER IF: NEGATIVE — SIGNIFICANT CHANGE UP
EBV EA IGG SER-ACNC: POSITIVE
EBV NA IGG SER IA-ACNC: 15.9 U/ML — SIGNIFICANT CHANGE UP
EBV PATRN SPEC IB-IMP: SIGNIFICANT CHANGE UP
EBV VCA IGG AVIDITY SER QL IA: POSITIVE
EBV VCA IGM SER IA-ACNC: 313 U/ML — HIGH
EBV VCA IGM SER IA-ACNC: <10 U/ML — SIGNIFICANT CHANGE UP
EBV VCA IGM TITR FLD: NEGATIVE — SIGNIFICANT CHANGE UP
FREE BETA HCG 1ST TRIMESTER: NORMAL
GLUCOSE UR-MCNC: NORMAL
HCG UR QL: 0.2 EU/DL
HGB UR QL STRIP.AUTO: NORMAL
INHIBIN A SERPL-MCNC: NORMAL
KETONES UR-MCNC: NORMAL
LEUKOCYTE ESTERASE UR QL STRIP: NORMAL
NITRITE UR QL STRIP: NORMAL
NOTES NTD: NORMAL
NT: NORMAL
PAPP-A SERPL-ACNC: NORMAL
PH UR STRIP: 5.5
PROCALCITONIN SERPL-MCNC: 0.2 NG/ML — HIGH (ref 0–0.04)
PROT UR STRIP-MCNC: NORMAL
SP GR UR STRIP: <1.005
SPECIMEN SOURCE: SIGNIFICANT CHANGE UP
U ESTRIOL SERPL-SCNC: NORMAL

## 2017-06-03 RX ADMIN — Medication 1 TABLET(S): at 08:20

## 2017-06-03 RX ADMIN — Medication 1 TABLET(S): at 07:45

## 2017-06-03 RX ADMIN — SODIUM CHLORIDE 125 MILLILITER(S): 9 INJECTION INTRAMUSCULAR; INTRAVENOUS; SUBCUTANEOUS at 05:01

## 2017-06-03 NOTE — PROGRESS NOTE ADULT - PROBLEM SELECTOR PLAN 3
likely due to GI losses from vomiting  -received Kphos
likely due to GI losses from vomiting  -- f/u BMP in AM

## 2017-06-03 NOTE — PROGRESS NOTE ADULT - SUBJECTIVE AND OBJECTIVE BOX
pt is 18+ weeks pregnant admitted for GI viral syndrome with nausea and vomiting    all cutltures neg     cxr-neg  pt feeling better   able to tolerate food and fluids  pt wants to go home    chest clear  abd and fundus soft nontender  +FH    obstetrically cleared for discharge    instructions and precautions reviewed with pt  f/u in office in 5 days    md michael

## 2017-06-03 NOTE — PROGRESS NOTE ADULT - PROBLEM SELECTOR PROBLEM 5
Migraine without status migrainosus, not intractable, unspecified migraine type
Migraine without status migrainosus, not intractable, unspecified migraine type

## 2017-06-03 NOTE — PROVIDER CONTACT NOTE (OTHER) - SITUATION
Labor and Delivery RN into do FHR check at bedside. Pt denies vaginal bleeding, denies loss of fluid, denies fetal movement yet with this pregnancy. FHR wnl 142-146 bpm. Pt states feeling much better.

## 2017-06-03 NOTE — DISCHARGE NOTE ADULT - CARE PLAN
Principal Discharge DX:	Viral illness  Goal:	home  Instructions for follow-up, activity and diet:	increased fluids, decreased activity  Secondary Diagnosis:	18 weeks gestation of pregnancy

## 2017-06-03 NOTE — PROGRESS NOTE ADULT - SUBJECTIVE AND OBJECTIVE BOX
INTERVAL HPI/OVERNIGHT EVENTS:    32 y/o F who is  @ 17 3/7 weeks by LMP with a  PMHx of of migraines presents to the ED with nausea and vomiting x 6 hours accompanied with fever (Tmax 102.4), chills, body aches and migraines, +sore throat and adenopathy  4 days ago which has since resolved.  She denies: abdominal pain, diarrhea, dysuria, rhinorrhea, vaginal bleeding, or discharge. she has minimal improvement after taking Tylenol at home. Patient has 3 y/o daughter who had similar symptoms today.     In the ED she was found to be febrile, tachycardic and mildly dirty UA; She received 2L IVF NS, 2 doses of Tylenol and ceftriaxone but continued to have fevers (102.3) and difficulty tolerating PO.     6/3: Patient was seen and examined at bedside. She feels much better, denies: abdominal pain, diarrhea, n/v/d. No chills or fevers overnight. She wants to go home. Denies cramps/contraction, bleeding or fluid leakage per vagina.        MEDICATIONS  (STANDING):  prenatal multivitamin 1Tablet(s) Oral daily  sodium chloride 0.9%. 1000milliLiter(s) IV Continuous <Continuous>  potassium acid phosphate/sodium acid phosphate tablet (K-PHOS No. 2) 1Tablet(s) Oral four times a day with meals    MEDICATIONS  (PRN):  acetaminophen   Tablet 650milliGRAM(s) Oral every 4 hours PRN For Temp greater than 38 C (100.4 F)      Allergies    No Known Allergies    Intolerances      REVIEW OF SYSTEMS:    CONSTITUTIONAL: No weakness, fevers or chills  EYES/ENT: No visual changes;  No vertigo or throat pain   NECK: No pain or stiffness  RESPIRATORY: No cough, wheezing, hemoptysis; No shortness of breath  CARDIOVASCULAR: No chest pain or palpitations  GASTROINTESTINAL: No abdominal or epigastric pain. No nausea, vomiting, or hematemesis; No diarrhea or constipation. No melena or hematochezia.  GENITOURINARY: No dysuria, frequency or hematuria  NEUROLOGICAL: No numbness or weakness  SKIN: No itching, burning, rashes, or lesions   All other review of systems is negative unless indicated above.    Vital Signs Last 24 Hrs  T(C): 36.7, Max: 37.9 (06-02 @ 11:05)  T(F): 98, Max: 100.3 ( @ 11:05)  HR: 100 (100 - 117)  BP: 93/59 (93/59 - 116/65)  RR: 18 (18 - 19)  SpO2: 98% (98% - 99%)    I&O's Detail    I & Os for current day (as of 2017 09:10)  =============================================  IN:    sodium chloride 0.9%.: 2950 ml    Total IN: 2950 ml  ---------------------------------------------  OUT:    Total OUT: 0 ml  ---------------------------------------------  Total NET: 2950 ml      Daily         PHYSICAL EXAM:    Constitutional: NAD, well-groomed, well-developed  HEENT: PERRLA, EOMI, Normal Hearing  Neck: No LAD, No JVD  Back: Normal spine flexure, No CVA tenderness, Full ROM  Respiratory: Normal Respiratory Effort, CTA B/L, no adventitious sounds  Cardiovascular: S1 and S2, RRR, no murmurs, rubs or gallops  Gastrointestinal: BS+, soft, NT/ND, no guarding, no rigidity, no hepatosplenomegaly, gravid uterus  Extremities: No peripheral edema b/l  Vascular: 2+ peripheral pulses  Neurological: A/O x 3, no focal deficits  Psychiatric: Normal mood, normal affect  Musculoskeletal: 5/5 strength b/l upper and lower extremities, full rom b/l upper and lower extremities   Skin: No rashes    LABS:                        12.3   11.5  )-----------( 187      ( 2017 10:02 )             34.8     06-02    142  |  112<H>  |  5<L>  ----------------------------<  95  3.6   |  23  |  0.62    Ca    8.2<L>      2017 02:55  Phos  2.1     06-02  Mg     2.1     06-02    TPro  5.9<L>  /  Alb  2.7<L>  /  TBili  0.4  /  DBili  x   /  AST  12<L>  /  ALT  12  /  AlkPhos  48  06-02    PT/INR - ( 2017 02:55 )   PT: 11.6 sec;   INR: 1.07 ratio         PTT - ( 2017 02:55 )  PTT:28.0 sec  Urinalysis Basic - ( 2017 06:30 )    Color: Yellow / Appearance: Clear / S.005 / pH: x  Gluc: x / Ketone: Moderate  / Bili: Negative / Urobili: Negative mg/dL   Blood: x / Protein: Negative mg/dL / Nitrite: Negative   Leuk Esterase: Negative / RBC: x / WBC x   Sq Epi: x / Non Sq Epi: x / Bacteria: x        LIVER FUNCTIONS - ( 2017 02:55 )  Alb: 2.7 g/dL / Pro: 5.9 gm/dL / ALK PHOS: 48 U/L / ALT: 12 U/L / AST: 12 U/L / GGT: x         Cytomegalovirus IgM Antibody, Serum (17 @ 16:00)    CMV IgM Antibody: <8.0 AU/mL    CMV IgM Interpretation: Negative: Method: [employer] Chemiluminescent Immunoassay  Reference ranges: (values expressed as AU/mL)              Negative     < 30.0 AU/mL              Equivocal     30.0 - 34.9 AU/mL              Positive      > 35.0 AU/mL     Cytomegalovirus IgG Antibody, Serum (17 @ 16:00)    CMV IgG Antibody: 1.20 U/mL    CMV IgG Interpretation: Positive: Method: [employer] Chemiluminescent Immunoassay  Reference Range: (values expressed as U/mL)             Negative        < 0.60        U/mL             Equivocal      0.60 - 0.69  U/mL             Positive          >= 0.70      U/mL  The presence of CytomPositive: egalovirus IgG antibody or seroconversion may  indicate recent antigenic stimulation but are not per se confirmatory for  either recent primary infection or reactivation of a pre-existing latent  process with active viral replication.  The titer of aPositive: single specimen  should not  be used to aid in the diagnosis of recent infection.  The assay for the  presence of anti-CMV IgM antibody should be used to diagnose recent  infection.      Ashley-Barr Virus Serologic Test (17 @ 16:00)    EBV VCA IgG EIA: 313.0 U/mL    EBV VCA IgM EIA: <10.0 U/mL    EBV EA Ab EIA: 14.2 U/mL    EBV Interpretation: See Note: INTERPRETATION OF ASHLEY BARR VIRUS (EBV) ANTIBODY RESULTS  EBV VCA IGG AB EBV NA IGG AB EBV VCA IGM AB EBV EA IGG AB Diagnosis  NEG NEG NEG NEG EBV Sero-negative  NEG NEG POS NEG Suspected primary infection (Early Phase)  POS NEG POS POS/NEG Past EBSee Note: V infection ( Convalescence)  POS POS NEG POS/NEG Past EBV infection  POS POS POS/NEG POS Reactivated Infection    Culture - Group A Streptococcus (17 @ 20:50)    Specimen Source: .Throat Throat    Culture Results:   Ruling out Beta hemolytic streptococcus    Culture - Urine (17 @ 20:50)    Specimen Source: .Urine None    Culture Results:   No growth    Culture - Blood (17 @ 19:26)    Specimen Source: .Blood None    Culture Results:   No growth to date.    RADIOLOGY & ADDITIONAL TESTS:    US Abdomen 17: Single live intrauterine pregnancy. Estimated gestational age (based on CRL) of 17 weeks 4 days. Estimated due date of 2017.    CXR 2017: No evidence of acute pulmonary disease.            D/C Home

## 2017-06-03 NOTE — PROGRESS NOTE ADULT - ATTENDING COMMENTS
Patient seen and examined with Dr. Ana Paula Riggs and Dr. Jj Jack on the Family Medicine Teaching Service.  Agree with history, physical, labs and plan which were reviewed in detail.
Patient seen and examined with Dr. Leandro Jeffrey, Dr. Dave Sanders and Dr. Jenny Mcnair on the Family Medicine Teaching Service.  Agree with history, physical, labs and plan which were reviewed in detail.

## 2017-06-03 NOTE — DISCHARGE NOTE ADULT - PATIENT PORTAL LINK FT
“You can access the [employer] Patient Portal, offered by [facility], by registering with the following website: [email or web address]”

## 2017-06-03 NOTE — PROGRESS NOTE ADULT - PROBLEM SELECTOR PLAN 5
-at baseline  - tylenol PRN pain  -Fioricet one time- as per conversation with OBGYN safe to use in pregnancy
-- at baseline  -- tylenol PRN pain  Fioricet one time- as per conversation with OBGYN safe to use in pregnancy

## 2017-06-03 NOTE — PROGRESS NOTE ADULT - ASSESSMENT
30 YO  @17w3d GA presented to the ED with nausea and vomiting for 6 hours, accompanied by fever (tmax 102.4), chills, body aches and migraines.  In the ED she was found to be febrile, tachycardic and mildly dirty UA; She received 2L IVNS, 2 rounds of tylenol, and ceftriaxone but continued to have fevers (102.3) and have difficulty tolerating PO.  Patient's UA was slightly contaminated but suggestive as source.  Presumed Urosepsis.
30 y/o  @ 17 3/7 weeks by LMP and a PMHx of of migraines presents to the ED for nausea and vomiting X 6 hours accompanied with fever (Tmax 102.4), chills, body aches and migraines.
Agree with above.  Patient seen and examined at bedside, still ill-appearing. Tolerated some crackers.  No CVA tenderness on exam.  OB sono unremarkable.  Cultures pending.  Continue IV hydration, Zofran prn, follow up cultures.
30 y/o  @ 17 3/7 weeks by LMP and a PMHx of of migraines presents to the ED for nausea and vomiting X 6 hours accompanied with fever (tmax 102.4), chills, body aches and migraines;

## 2017-06-03 NOTE — DISCHARGE NOTE ADULT - HOSPITAL COURSE
admitted with fever nausea and vomiting  pt diagnosed with viral syndrome and dehydration  treated with iv hydration and anti-nausea meds  all cultures neg and cxr-neg  obstetrically stable

## 2017-06-03 NOTE — PROGRESS NOTE ADULT - PROBLEM SELECTOR PLAN 1
-no fevers overnignt  -Patient can tolerate PO diet  -Supportive care  -no further antibiotics needed
- patient febrile (102.3 F), tachycardic (), Leukocytosis (16.7)   - UA: contaminated, suggests UTI  - s/p Rocephin 1g IV x1  - Abx management as per medicine  -  in the ED  - Needs OB sono in the AM  - Advance diet as tolerated     Discussed with Dr. Bacon
still with fever  continue IVF  tylenol for fever suppression  supportive care  no further antibiotics at this time

## 2017-06-03 NOTE — PROGRESS NOTE ADULT - PROBLEM SELECTOR PLAN 4
- BPM  - continue PNV daily  - OB consult - Jordi, cleared from obstetrical standpoint  -Ob appt on Wednesday
--  BPM  -- continue PNV daily  -- OB consult - Bacon

## 2017-06-03 NOTE — PROGRESS NOTE ADULT - PROBLEM SELECTOR PLAN 2
-Resolved- likely due to viral Illness  -with leukocytosis, fever, tachycardia   -UA shows mild LE, but many epithelial cells, contaminated?, f/u UCx- repeat negative  -RVP negative negative  -BCx: NGTD  - Rapid Strep:  NGTD  -CXR Negative  - lactate 0.9, WNL  -- rec'd ceftriaxone in ED, will observe off ABx for now
Resolved  with leukocytosis, fever, tachycardia  DDx include gastroenteritis vs viral syndrome   -- admit to medical floor  -- UA shows mild LE, but many epithelial cells, contaminated?, f/u UCx- repeat negative  -- RVP negative, f/u BCx x 2  -- rapid strep negative, f/u throat Cx  -- CXR shows NAD  -- WBC 16K with left shift, f/u CBC in AM  -- Lactate 0.9, WNL  -- s/p 2L IVNS in ED, continue IVNS @ 125 cc/hr  -- rec'd ceftriaxone in ED, will observe off ABx for now  -- ID consult - Ana per Dr. Guy for tx recommendation for asymptomatic bacteruria in contaminated UA  -- may advance diet as tolerated  -- no longer nausea, will d/c zofran per Dr. Guy  -- tylenol PRN fever

## 2017-06-03 NOTE — DISCHARGE NOTE ADULT - CARE PROVIDER_API CALL
Jorge Bacon (MD), Obstetrics and Gynecology  [address] [place] [address]  Phone: [phone]  Fax: [phone]

## 2017-06-03 NOTE — DISCHARGE NOTE ADULT - MEDICATION SUMMARY - MEDICATIONS TO TAKE
I will START or STAY ON the medications listed below when I get home from the hospital:    SELECT-OB+   CLARENCE DHA  -- Indication: For Pregnancy

## 2017-06-04 LAB
B19V IGG SER-ACNC: 0.2 INDEX — SIGNIFICANT CHANGE UP (ref 0–0.8)
B19V IGG+IGM SER-IMP: NEGATIVE — SIGNIFICANT CHANGE UP
B19V IGG+IGM SER-IMP: SIGNIFICANT CHANGE UP
B19V IGM FLD-ACNC: 0.3 INDEX — SIGNIFICANT CHANGE UP (ref 0–0.8)
B19V IGM SER-ACNC: NEGATIVE — SIGNIFICANT CHANGE UP

## 2017-06-05 PROBLEM — G43.909 MIGRAINE, UNSPECIFIED, NOT INTRACTABLE, WITHOUT STATUS MIGRAINOSUS: Chronic | Status: ACTIVE | Noted: 2017-06-01

## 2017-06-07 DIAGNOSIS — N39.0 URINARY TRACT INFECTION, SITE NOT SPECIFIED: ICD-10-CM

## 2017-06-07 DIAGNOSIS — E86.0 DEHYDRATION: ICD-10-CM

## 2017-06-07 DIAGNOSIS — B34.9 VIRAL INFECTION, UNSPECIFIED: ICD-10-CM

## 2017-06-07 DIAGNOSIS — G43.909 MIGRAINE, UNSPECIFIED, NOT INTRACTABLE, WITHOUT STATUS MIGRAINOSUS: ICD-10-CM

## 2017-06-07 DIAGNOSIS — R50.9 FEVER, UNSPECIFIED: ICD-10-CM

## 2017-06-07 DIAGNOSIS — Z3A.18 18 WEEKS GESTATION OF PREGNANCY: ICD-10-CM

## 2017-06-07 DIAGNOSIS — E87.6 HYPOKALEMIA: ICD-10-CM

## 2017-06-07 DIAGNOSIS — O98.812 OTHER MATERNAL INFECTIOUS AND PARASITIC DISEASES COMPLICATING PREGNANCY, SECOND TRIMESTER: ICD-10-CM

## 2017-06-07 DIAGNOSIS — O98.512 OTHER VIRAL DISEASES COMPLICATING PREGNANCY, SECOND TRIMESTER: ICD-10-CM

## 2017-06-07 DIAGNOSIS — A41.9 SEPSIS, UNSPECIFIED ORGANISM: ICD-10-CM

## 2017-06-07 LAB
CULTURE RESULTS: SIGNIFICANT CHANGE UP
CULTURE RESULTS: SIGNIFICANT CHANGE UP
SPECIMEN SOURCE: SIGNIFICANT CHANGE UP
SPECIMEN SOURCE: SIGNIFICANT CHANGE UP

## 2017-06-08 ENCOUNTER — RX RENEWAL (OUTPATIENT)
Age: 32
End: 2017-06-08

## 2017-06-08 ENCOUNTER — RESULT REVIEW (OUTPATIENT)
Age: 32
End: 2017-06-08

## 2017-06-08 ENCOUNTER — RESULT CHARGE (OUTPATIENT)
Age: 32
End: 2017-06-08

## 2017-06-08 DIAGNOSIS — J02.0 STREPTOCOCCAL PHARYNGITIS: ICD-10-CM

## 2017-06-08 RX ORDER — NYSTATIN AND TRIAMCINOLONE ACETONIDE 100000; 1 MG/G; MG/G
100000-0.1 CREAM TOPICAL 3 TIMES DAILY
Qty: 40 | Refills: 0 | Status: COMPLETED | COMMUNITY
Start: 2017-05-16 | End: 2017-05-16

## 2017-06-08 RX ORDER — TERCONAZOLE 8 MG/G
0.8 CREAM VAGINAL
Qty: 1 | Refills: 2 | Status: COMPLETED | COMMUNITY
Start: 2017-05-16 | End: 2017-05-16

## 2017-06-08 RX ORDER — AMOXICILLIN 250 MG/5ML
1 SUSPENSION, RECONSTITUTED, ORAL (ML) ORAL
Qty: 20 | Refills: 0 | OUTPATIENT
Start: 2017-06-08 | End: 2017-06-18

## 2017-06-08 NOTE — CHART NOTE - NSCHARTNOTEFT_GEN_A_CORE
strep positive on culture; patient notified; amoxicillin 875 mg PO BID x 10 days; she will discuss with OB tomorrow;

## 2017-06-09 ENCOUNTER — LABORATORY RESULT (OUTPATIENT)
Age: 32
End: 2017-06-09

## 2017-06-09 ENCOUNTER — APPOINTMENT (OUTPATIENT)
Dept: OBGYN | Facility: CLINIC | Age: 32
End: 2017-06-09

## 2017-06-09 VITALS
BODY MASS INDEX: 20.16 KG/M2 | TEMPERATURE: 94 F | OXYGEN SATURATION: 98 % | WEIGHT: 121 LBS | DIASTOLIC BLOOD PRESSURE: 70 MMHG | HEIGHT: 65 IN | SYSTOLIC BLOOD PRESSURE: 98 MMHG | HEART RATE: 94 BPM

## 2017-06-09 DIAGNOSIS — R53.83 OTHER MALAISE: ICD-10-CM

## 2017-06-09 DIAGNOSIS — R63.8 OTHER SYMPTOMS AND SIGNS CONCERNING FOOD AND FLUID INTAKE: ICD-10-CM

## 2017-06-09 DIAGNOSIS — Z87.440 PERSONAL HISTORY OF URINARY (TRACT) INFECTIONS: ICD-10-CM

## 2017-06-09 DIAGNOSIS — R53.81 OTHER MALAISE: ICD-10-CM

## 2017-06-09 LAB
BILIRUB UR QL STRIP: NEGATIVE
CLARITY UR: CLEAR
COLLECTION METHOD: NORMAL
GLUCOSE UR-MCNC: NEGATIVE
HCG UR QL: 0.2 EU/DL
HGB UR QL STRIP.AUTO: NEGATIVE
KETONES UR-MCNC: NEGATIVE
LEUKOCYTE ESTERASE UR QL STRIP: NEGATIVE
NITRITE UR QL STRIP: NEGATIVE
PH UR STRIP: 6
PROT UR STRIP-MCNC: NEGATIVE
SP GR UR STRIP: 1.01

## 2017-06-12 LAB
APPEARANCE: CLEAR
BASOPHILS # BLD AUTO: 0.04 K/UL
BASOPHILS NFR BLD AUTO: 0.4 %
BILIRUBIN URINE: NEGATIVE
BLOOD URINE: NEGATIVE
COLOR: YELLOW
EOSINOPHIL # BLD AUTO: 0.22 K/UL
EOSINOPHIL NFR BLD AUTO: 2 %
GLUCOSE QUALITATIVE U: NORMAL MG/DL
HCT VFR BLD CALC: 39.6 %
HGB BLD-MCNC: 12.8 G/DL
IMM GRANULOCYTES NFR BLD AUTO: 0.6 %
KETONES URINE: NEGATIVE
LEUKOCYTE ESTERASE URINE: NEGATIVE
LYMPHOCYTES # BLD AUTO: 2.13 K/UL
LYMPHOCYTES NFR BLD AUTO: 19.3 %
MAN DIFF?: NORMAL
MCHC RBC-ENTMCNC: 30 PG
MCHC RBC-ENTMCNC: 32.3 GM/DL
MCV RBC AUTO: 92.7 FL
MONOCYTES # BLD AUTO: 0.53 K/UL
MONOCYTES NFR BLD AUTO: 4.8 %
NEUTROPHILS # BLD AUTO: 8.05 K/UL
NEUTROPHILS NFR BLD AUTO: 72.9 %
NITRITE URINE: NEGATIVE
PH URINE: 6.5
PLATELET # BLD AUTO: 322 K/UL
PROTEIN URINE: NEGATIVE MG/DL
RBC # BLD: 4.27 M/UL
RBC # FLD: 13.9 %
SPECIFIC GRAVITY URINE: 1.01
UROBILINOGEN URINE: NORMAL MG/DL
WBC # FLD AUTO: 11.04 K/UL

## 2017-06-20 ENCOUNTER — RESULT CHARGE (OUTPATIENT)
Age: 32
End: 2017-06-20

## 2017-06-21 ENCOUNTER — APPOINTMENT (OUTPATIENT)
Dept: ANTEPARTUM | Facility: CLINIC | Age: 32
End: 2017-06-21

## 2017-06-21 ENCOUNTER — ASOB RESULT (OUTPATIENT)
Age: 32
End: 2017-06-21

## 2017-06-21 ENCOUNTER — APPOINTMENT (OUTPATIENT)
Dept: OBGYN | Facility: CLINIC | Age: 32
End: 2017-06-21

## 2017-06-21 VITALS
HEIGHT: 65 IN | DIASTOLIC BLOOD PRESSURE: 66 MMHG | SYSTOLIC BLOOD PRESSURE: 100 MMHG | RESPIRATION RATE: 18 BRPM | BODY MASS INDEX: 20.66 KG/M2 | WEIGHT: 124 LBS | HEART RATE: 74 BPM

## 2017-07-19 ENCOUNTER — APPOINTMENT (OUTPATIENT)
Dept: OBGYN | Facility: CLINIC | Age: 32
End: 2017-07-19

## 2017-07-19 VITALS
HEIGHT: 65 IN | SYSTOLIC BLOOD PRESSURE: 100 MMHG | BODY MASS INDEX: 21.99 KG/M2 | DIASTOLIC BLOOD PRESSURE: 60 MMHG | WEIGHT: 132 LBS

## 2017-07-19 RX ORDER — EMOLLIENT COMBINATION NO.53
CREAM (GRAM) TOPICAL
Qty: 450 | Refills: 0 | Status: DISCONTINUED | COMMUNITY
Start: 2017-04-12 | End: 2017-07-19

## 2017-07-19 RX ORDER — CLOBETASOL PROPIONATE 0.5 MG/G
0.05 AEROSOL, FOAM TOPICAL
Qty: 50 | Refills: 0 | Status: DISCONTINUED | COMMUNITY
Start: 2017-04-12 | End: 2017-07-19

## 2017-07-19 RX ORDER — CEFUROXIME AXETIL 500 MG/1
500 TABLET ORAL
Qty: 14 | Refills: 0 | Status: DISCONTINUED | COMMUNITY
Start: 2017-06-08 | End: 2017-07-19

## 2017-07-19 RX ORDER — AMPICILLIN 250 MG/1
250 CAPSULE ORAL 4 TIMES DAILY
Qty: 28 | Refills: 0 | Status: DISCONTINUED | COMMUNITY
Start: 2017-06-09 | End: 2017-07-19

## 2017-07-19 RX ORDER — MOMETASONE FUROATE 1 MG/G
0.1 OINTMENT TOPICAL
Qty: 15 | Refills: 0 | Status: DISCONTINUED | COMMUNITY
Start: 2017-04-12 | End: 2017-07-19

## 2017-08-08 ENCOUNTER — RESULT CHARGE (OUTPATIENT)
Age: 32
End: 2017-08-08

## 2017-08-09 ENCOUNTER — APPOINTMENT (OUTPATIENT)
Dept: OBGYN | Facility: CLINIC | Age: 32
End: 2017-08-09
Payer: COMMERCIAL

## 2017-08-09 ENCOUNTER — LABORATORY RESULT (OUTPATIENT)
Age: 32
End: 2017-08-09

## 2017-08-09 VITALS
HEART RATE: 72 BPM | RESPIRATION RATE: 16 BRPM | SYSTOLIC BLOOD PRESSURE: 104 MMHG | DIASTOLIC BLOOD PRESSURE: 62 MMHG | HEIGHT: 65 IN | WEIGHT: 134 LBS | TEMPERATURE: 96 F | BODY MASS INDEX: 22.33 KG/M2

## 2017-08-09 PROCEDURE — 0502F SUBSEQUENT PRENATAL CARE: CPT

## 2017-08-09 PROCEDURE — 36415 COLL VENOUS BLD VENIPUNCTURE: CPT

## 2017-08-15 LAB
BASOPHILS # BLD AUTO: 0.02 K/UL
BASOPHILS NFR BLD AUTO: 0.3 %
BLD GP AB SCN SERPL QL: NORMAL
EOSINOPHIL # BLD AUTO: 0.27 K/UL
EOSINOPHIL NFR BLD AUTO: 3.5 %
GLUCOSE 1H P 50 G GLC PO SERPL-MCNC: 101 MG/DL
HCT VFR BLD CALC: 39.2 %
HGB BLD-MCNC: 11.3 G/DL
IMM GRANULOCYTES NFR BLD AUTO: 0.5 %
LYMPHOCYTES # BLD AUTO: 1.34 K/UL
LYMPHOCYTES NFR BLD AUTO: 17.4 %
MAN DIFF?: NORMAL
MCHC RBC-ENTMCNC: 28.8 GM/DL
MCHC RBC-ENTMCNC: 29.4 PG
MCV RBC AUTO: 102.1 FL
MONOCYTES # BLD AUTO: 0.39 K/UL
MONOCYTES NFR BLD AUTO: 5.1 %
NEUTROPHILS # BLD AUTO: 5.65 K/UL
NEUTROPHILS NFR BLD AUTO: 73.2 %
PLATELET # BLD AUTO: 212 K/UL
RBC # BLD: 3.84 M/UL
RBC # FLD: 14.3 %
WBC # FLD AUTO: 7.71 K/UL

## 2017-08-30 ENCOUNTER — APPOINTMENT (OUTPATIENT)
Dept: OBGYN | Facility: CLINIC | Age: 32
End: 2017-08-30
Payer: COMMERCIAL

## 2017-08-30 ENCOUNTER — APPOINTMENT (OUTPATIENT)
Dept: ANTEPARTUM | Facility: CLINIC | Age: 32
End: 2017-08-30
Payer: COMMERCIAL

## 2017-08-30 ENCOUNTER — ASOB RESULT (OUTPATIENT)
Age: 32
End: 2017-08-30

## 2017-08-30 VITALS — WEIGHT: 137 LBS | BODY MASS INDEX: 22.8 KG/M2 | DIASTOLIC BLOOD PRESSURE: 62 MMHG | SYSTOLIC BLOOD PRESSURE: 105 MMHG

## 2017-08-30 PROCEDURE — 0502F SUBSEQUENT PRENATAL CARE: CPT

## 2017-08-30 PROCEDURE — 76816 OB US FOLLOW-UP PER FETUS: CPT

## 2017-09-13 ENCOUNTER — APPOINTMENT (OUTPATIENT)
Dept: OBGYN | Facility: CLINIC | Age: 32
End: 2017-09-13
Payer: COMMERCIAL

## 2017-09-13 VITALS
HEIGHT: 65 IN | WEIGHT: 138 LBS | DIASTOLIC BLOOD PRESSURE: 60 MMHG | SYSTOLIC BLOOD PRESSURE: 100 MMHG | BODY MASS INDEX: 22.99 KG/M2

## 2017-09-13 DIAGNOSIS — Z87.09 PERSONAL HISTORY OF OTHER DISEASES OF THE RESPIRATORY SYSTEM: ICD-10-CM

## 2017-09-13 PROCEDURE — 99213 OFFICE O/P EST LOW 20 MIN: CPT | Mod: 25

## 2017-09-18 ENCOUNTER — RESULT REVIEW (OUTPATIENT)
Age: 32
End: 2017-09-18

## 2017-09-18 LAB — BACTERIA SPEC CULT: ABNORMAL

## 2017-09-19 ENCOUNTER — OTHER (OUTPATIENT)
Age: 32
End: 2017-09-19

## 2017-09-19 ENCOUNTER — RX RENEWAL (OUTPATIENT)
Age: 32
End: 2017-09-19

## 2017-09-27 ENCOUNTER — APPOINTMENT (OUTPATIENT)
Dept: OBGYN | Facility: CLINIC | Age: 32
End: 2017-09-27
Payer: COMMERCIAL

## 2017-09-27 DIAGNOSIS — Z87.09 PERSONAL HISTORY OF OTHER DISEASES OF THE RESPIRATORY SYSTEM: ICD-10-CM

## 2017-09-27 PROCEDURE — 99213 OFFICE O/P EST LOW 20 MIN: CPT | Mod: 24

## 2017-09-29 ENCOUNTER — MESSAGE (OUTPATIENT)
Age: 32
End: 2017-09-29

## 2017-10-02 ENCOUNTER — RESULT REVIEW (OUTPATIENT)
Age: 32
End: 2017-10-02

## 2017-10-02 LAB — BACTERIA SPEC CULT: NORMAL

## 2017-10-09 ENCOUNTER — APPOINTMENT (OUTPATIENT)
Dept: OBGYN | Facility: CLINIC | Age: 32
End: 2017-10-09
Payer: COMMERCIAL

## 2017-10-09 VITALS
SYSTOLIC BLOOD PRESSURE: 100 MMHG | RESPIRATION RATE: 16 BRPM | WEIGHT: 144 LBS | DIASTOLIC BLOOD PRESSURE: 62 MMHG | HEIGHT: 65 IN | BODY MASS INDEX: 23.99 KG/M2

## 2017-10-09 PROCEDURE — 90688 IIV4 VACCINE SPLT 0.5 ML IM: CPT

## 2017-10-09 PROCEDURE — 36415 COLL VENOUS BLD VENIPUNCTURE: CPT

## 2017-10-09 PROCEDURE — G0008: CPT

## 2017-10-09 PROCEDURE — 0502F SUBSEQUENT PRENATAL CARE: CPT

## 2017-10-09 PROCEDURE — 96372 THER/PROPH/DIAG INJ SC/IM: CPT

## 2017-10-11 ENCOUNTER — APPOINTMENT (OUTPATIENT)
Dept: ANTEPARTUM | Facility: CLINIC | Age: 32
End: 2017-10-11
Payer: COMMERCIAL

## 2017-10-11 ENCOUNTER — ASOB RESULT (OUTPATIENT)
Age: 32
End: 2017-10-11

## 2017-10-11 LAB
BASOPHILS # BLD AUTO: 0.02 K/UL
BASOPHILS NFR BLD AUTO: 0.2 %
EOSINOPHIL # BLD AUTO: 0.21 K/UL
EOSINOPHIL NFR BLD AUTO: 2 %
GP B STREP DNA SPEC QL NAA+PROBE: NORMAL
GP B STREP DNA SPEC QL NAA+PROBE: NOT DETECTED
HCT VFR BLD CALC: 36 %
HGB BLD-MCNC: 11.1 G/DL
HIV1+2 AB SPEC QL IA.RAPID: NONREACTIVE
IMM GRANULOCYTES NFR BLD AUTO: 0.8 %
LYMPHOCYTES # BLD AUTO: 2.02 K/UL
LYMPHOCYTES NFR BLD AUTO: 19.2 %
MAN DIFF?: NORMAL
MCHC RBC-ENTMCNC: 27.3 PG
MCHC RBC-ENTMCNC: 30.8 GM/DL
MCV RBC AUTO: 88.5 FL
MONOCYTES # BLD AUTO: 0.54 K/UL
MONOCYTES NFR BLD AUTO: 5.1 %
NEUTROPHILS # BLD AUTO: 7.65 K/UL
NEUTROPHILS NFR BLD AUTO: 72.7 %
PLATELET # BLD AUTO: 241 K/UL
RBC # BLD: 4.07 M/UL
RBC # FLD: 13.4 %
SOURCE GBS: NORMAL
WBC # FLD AUTO: 10.52 K/UL

## 2017-10-11 PROCEDURE — 76805 OB US >/= 14 WKS SNGL FETUS: CPT

## 2017-10-11 PROCEDURE — 99241 OFFICE CONSULTATION NEW/ESTAB PATIENT 15 MIN: CPT | Mod: 25

## 2017-10-11 PROCEDURE — 76818 FETAL BIOPHYS PROFILE W/NST: CPT

## 2017-10-17 ENCOUNTER — RESULT CHARGE (OUTPATIENT)
Age: 32
End: 2017-10-17

## 2017-10-18 ENCOUNTER — APPOINTMENT (OUTPATIENT)
Dept: OBGYN | Facility: CLINIC | Age: 32
End: 2017-10-18
Payer: COMMERCIAL

## 2017-10-18 VITALS
BODY MASS INDEX: 23.99 KG/M2 | DIASTOLIC BLOOD PRESSURE: 64 MMHG | SYSTOLIC BLOOD PRESSURE: 118 MMHG | HEIGHT: 65 IN | WEIGHT: 144 LBS

## 2017-10-18 PROCEDURE — 90715 TDAP VACCINE 7 YRS/> IM: CPT

## 2017-10-18 PROCEDURE — 90471 IMMUNIZATION ADMIN: CPT

## 2017-10-18 PROCEDURE — 0502F SUBSEQUENT PRENATAL CARE: CPT

## 2017-10-20 ENCOUNTER — APPOINTMENT (OUTPATIENT)
Dept: ANTEPARTUM | Facility: CLINIC | Age: 32
End: 2017-10-20
Payer: COMMERCIAL

## 2017-10-20 ENCOUNTER — ASOB RESULT (OUTPATIENT)
Age: 32
End: 2017-10-20

## 2017-10-20 PROCEDURE — 76818 FETAL BIOPHYS PROFILE W/NST: CPT

## 2017-10-20 PROCEDURE — 76815 OB US LIMITED FETUS(S): CPT

## 2017-10-21 LAB
BILIRUB UR QL STRIP: NORMAL
CLARITY UR: CLEAR
COLLECTION METHOD: NORMAL
GLUCOSE UR-MCNC: NORMAL
HCG UR QL: 0.2 EU/DL
HGB UR QL STRIP.AUTO: NORMAL
KETONES UR-MCNC: NORMAL
LEUKOCYTE ESTERASE UR QL STRIP: NORMAL
NITRITE UR QL STRIP: NORMAL
PH UR STRIP: 7.5
PROT UR STRIP-MCNC: NORMAL
SP GR UR STRIP: 1.02

## 2017-10-23 ENCOUNTER — RX RENEWAL (OUTPATIENT)
Age: 32
End: 2017-10-23

## 2017-10-24 DIAGNOSIS — Z3A.17 17 WEEKS GESTATION OF PREGNANCY: ICD-10-CM

## 2017-10-24 DIAGNOSIS — Z3A.35 35 WEEKS GESTATION OF PREGNANCY: ICD-10-CM

## 2017-10-24 DIAGNOSIS — Z3A.18 18 WEEKS GESTATION OF PREGNANCY: ICD-10-CM

## 2017-10-24 DIAGNOSIS — Z3A.27 27 WEEKS GESTATION OF PREGNANCY: ICD-10-CM

## 2017-10-24 DIAGNOSIS — Z3A.14 14 WEEKS GESTATION OF PREGNANCY: ICD-10-CM

## 2017-10-24 DIAGNOSIS — Z3A.32 32 WEEKS GESTATION OF PREGNANCY: ICD-10-CM

## 2017-10-24 DIAGNOSIS — Z3A.26 26 WEEKS GESTATION OF PREGNANCY: ICD-10-CM

## 2017-10-25 ENCOUNTER — APPOINTMENT (OUTPATIENT)
Dept: OBGYN | Facility: CLINIC | Age: 32
End: 2017-10-25
Payer: COMMERCIAL

## 2017-10-25 VITALS
WEIGHT: 146 LBS | BODY MASS INDEX: 24.32 KG/M2 | DIASTOLIC BLOOD PRESSURE: 68 MMHG | SYSTOLIC BLOOD PRESSURE: 110 MMHG | HEIGHT: 65 IN

## 2017-10-25 PROCEDURE — 0502F SUBSEQUENT PRENATAL CARE: CPT

## 2017-10-27 ENCOUNTER — APPOINTMENT (OUTPATIENT)
Dept: ANTEPARTUM | Facility: CLINIC | Age: 32
End: 2017-10-27
Payer: COMMERCIAL

## 2017-10-27 ENCOUNTER — RX RENEWAL (OUTPATIENT)
Age: 32
End: 2017-10-27

## 2017-10-27 ENCOUNTER — ASOB RESULT (OUTPATIENT)
Age: 32
End: 2017-10-27

## 2017-10-27 PROCEDURE — 76818 FETAL BIOPHYS PROFILE W/NST: CPT

## 2017-10-30 ENCOUNTER — APPOINTMENT (OUTPATIENT)
Dept: OBGYN | Facility: CLINIC | Age: 32
End: 2017-10-30
Payer: COMMERCIAL

## 2017-10-30 VITALS
WEIGHT: 147 LBS | BODY MASS INDEX: 24.49 KG/M2 | DIASTOLIC BLOOD PRESSURE: 60 MMHG | HEIGHT: 65 IN | SYSTOLIC BLOOD PRESSURE: 98 MMHG

## 2017-10-30 PROCEDURE — 81003 URINALYSIS AUTO W/O SCOPE: CPT | Mod: QW

## 2017-10-30 PROCEDURE — 0502F SUBSEQUENT PRENATAL CARE: CPT

## 2017-10-31 ENCOUNTER — OUTPATIENT (OUTPATIENT)
Dept: OUTPATIENT SERVICES | Facility: HOSPITAL | Age: 32
LOS: 1 days | Discharge: HOME | End: 2017-10-31

## 2017-10-31 DIAGNOSIS — O34.219 MATERNAL CARE FOR UNSPECIFIED TYPE SCAR FROM PREVIOUS CESAREAN DELIVERY: ICD-10-CM

## 2017-10-31 DIAGNOSIS — Z98.891 HISTORY OF UTERINE SCAR FROM PREVIOUS SURGERY: Chronic | ICD-10-CM

## 2017-10-31 LAB
BASOPHILS # BLD AUTO: 0.1 K/UL — SIGNIFICANT CHANGE UP (ref 0–0.2)
BASOPHILS NFR BLD AUTO: 1.1 % — SIGNIFICANT CHANGE UP (ref 0–2)
BLD GP AB SCN SERPL QL: SIGNIFICANT CHANGE UP
EOSINOPHIL # BLD AUTO: 0.3 K/UL — SIGNIFICANT CHANGE UP (ref 0–0.5)
EOSINOPHIL NFR BLD AUTO: 2.3 % — SIGNIFICANT CHANGE UP (ref 0–6)
HCT VFR BLD CALC: 36.4 % — SIGNIFICANT CHANGE UP (ref 34.5–45)
HGB BLD-MCNC: 12.1 G/DL — SIGNIFICANT CHANGE UP (ref 11.5–15.5)
LYMPHOCYTES # BLD AUTO: 1.8 K/UL — SIGNIFICANT CHANGE UP (ref 1–3.3)
LYMPHOCYTES # BLD AUTO: 16.7 % — SIGNIFICANT CHANGE UP (ref 13–44)
MCHC RBC-ENTMCNC: 27.5 PG — SIGNIFICANT CHANGE UP (ref 27–34)
MCHC RBC-ENTMCNC: 33.1 GM/DL — SIGNIFICANT CHANGE UP (ref 32–36)
MCV RBC AUTO: 83 FL — SIGNIFICANT CHANGE UP (ref 80–100)
MONOCYTES # BLD AUTO: 0.6 K/UL — SIGNIFICANT CHANGE UP (ref 0–0.9)
MONOCYTES NFR BLD AUTO: 5.1 % — SIGNIFICANT CHANGE UP (ref 2–14)
NEUTROPHILS # BLD AUTO: 8.2 K/UL — HIGH (ref 1.8–7.4)
NEUTROPHILS NFR BLD AUTO: 74.8 % — SIGNIFICANT CHANGE UP (ref 43–77)
PLATELET # BLD AUTO: 229 K/UL — SIGNIFICANT CHANGE UP (ref 150–400)
RBC # BLD: 4.38 M/UL — SIGNIFICANT CHANGE UP (ref 3.8–5.2)
RBC # FLD: 12.7 % — SIGNIFICANT CHANGE UP (ref 10.3–14.5)
TYPE + AB SCN PNL BLD: SIGNIFICANT CHANGE UP
WBC # BLD: 11 K/UL — HIGH (ref 3.8–10.5)
WBC # FLD AUTO: 11 K/UL — HIGH (ref 3.8–10.5)

## 2017-10-31 NOTE — CHART NOTE - NSCHARTNOTEFT_GEN_A_CORE
Patient seen in PST today:    EZ sponges, and day of procedure instructions provided and reviewed with patient and s/o.    Patient also instructed can use Dial antimicrobial soap as alternative to prep, secondary to PMHX: eczema.    Patient instructed to follow Dr. Bacon pre-procedure instructions.

## 2017-11-01 ENCOUNTER — INPATIENT (INPATIENT)
Facility: HOSPITAL | Age: 32
LOS: 2 days | Discharge: HOME | End: 2017-11-04
Attending: OBSTETRICS & GYNECOLOGY | Admitting: OBSTETRICS & GYNECOLOGY
Payer: COMMERCIAL

## 2017-11-01 ENCOUNTER — APPOINTMENT (OUTPATIENT)
Dept: OBGYN | Facility: HOSPITAL | Age: 32
End: 2017-11-01

## 2017-11-01 VITALS — HEIGHT: 65 IN | WEIGHT: 145.51 LBS

## 2017-11-01 DIAGNOSIS — Z98.891 HISTORY OF UTERINE SCAR FROM PREVIOUS SURGERY: Chronic | ICD-10-CM

## 2017-11-01 LAB — T PALLIDUM AB TITR SER: NEGATIVE — SIGNIFICANT CHANGE UP

## 2017-11-01 PROCEDURE — 59510 CESAREAN DELIVERY: CPT

## 2017-11-01 RX ORDER — DIPHENHYDRAMINE HCL 50 MG
25 CAPSULE ORAL EVERY 6 HOURS
Qty: 0 | Refills: 0 | Status: DISCONTINUED | OUTPATIENT
Start: 2017-11-01 | End: 2017-11-04

## 2017-11-01 RX ORDER — HEPARIN SODIUM 5000 [USP'U]/ML
5000 INJECTION INTRAVENOUS; SUBCUTANEOUS EVERY 12 HOURS
Qty: 0 | Refills: 0 | Status: DISCONTINUED | OUTPATIENT
Start: 2017-11-01 | End: 2017-11-04

## 2017-11-01 RX ORDER — IBUPROFEN 200 MG
600 TABLET ORAL EVERY 6 HOURS
Qty: 0 | Refills: 0 | Status: DISCONTINUED | OUTPATIENT
Start: 2017-11-01 | End: 2017-11-04

## 2017-11-01 RX ORDER — FERROUS SULFATE 325(65) MG
325 TABLET ORAL DAILY
Qty: 0 | Refills: 0 | Status: DISCONTINUED | OUTPATIENT
Start: 2017-11-01 | End: 2017-11-04

## 2017-11-01 RX ORDER — SODIUM CHLORIDE 9 MG/ML
1000 INJECTION, SOLUTION INTRAVENOUS ONCE
Qty: 0 | Refills: 0 | Status: COMPLETED | OUTPATIENT
Start: 2017-11-01 | End: 2017-11-01

## 2017-11-01 RX ORDER — CITRIC ACID/SODIUM CITRATE 300-500 MG
30 SOLUTION, ORAL ORAL ONCE
Qty: 0 | Refills: 0 | Status: COMPLETED | OUTPATIENT
Start: 2017-11-01 | End: 2017-11-01

## 2017-11-01 RX ORDER — TETANUS TOXOID, REDUCED DIPHTHERIA TOXOID AND ACELLULAR PERTUSSIS VACCINE, ADSORBED 5; 2.5; 8; 8; 2.5 [IU]/.5ML; [IU]/.5ML; UG/.5ML; UG/.5ML; UG/.5ML
0.5 SUSPENSION INTRAMUSCULAR ONCE
Qty: 0 | Refills: 0 | Status: DISCONTINUED | OUTPATIENT
Start: 2017-11-01 | End: 2017-11-04

## 2017-11-01 RX ORDER — SODIUM CHLORIDE 9 MG/ML
1000 INJECTION, SOLUTION INTRAVENOUS
Qty: 0 | Refills: 0 | Status: DISCONTINUED | OUTPATIENT
Start: 2017-11-01 | End: 2017-11-04

## 2017-11-01 RX ORDER — NALOXONE HYDROCHLORIDE 4 MG/.1ML
0.1 SPRAY NASAL
Qty: 0 | Refills: 0 | Status: DISCONTINUED | OUTPATIENT
Start: 2017-11-01 | End: 2017-11-04

## 2017-11-01 RX ORDER — METOCLOPRAMIDE HCL 10 MG
10 TABLET ORAL ONCE
Qty: 0 | Refills: 0 | Status: DISCONTINUED | OUTPATIENT
Start: 2017-11-01 | End: 2017-11-04

## 2017-11-01 RX ORDER — GLYCERIN ADULT
1 SUPPOSITORY, RECTAL RECTAL AT BEDTIME
Qty: 0 | Refills: 0 | Status: DISCONTINUED | OUTPATIENT
Start: 2017-11-01 | End: 2017-11-04

## 2017-11-01 RX ORDER — DOCUSATE SODIUM 100 MG
100 CAPSULE ORAL
Qty: 0 | Refills: 0 | Status: DISCONTINUED | OUTPATIENT
Start: 2017-11-01 | End: 2017-11-04

## 2017-11-01 RX ORDER — HYDROMORPHONE HYDROCHLORIDE 2 MG/ML
1.5 INJECTION INTRAMUSCULAR; INTRAVENOUS; SUBCUTANEOUS
Qty: 0 | Refills: 0 | Status: DISCONTINUED | OUTPATIENT
Start: 2017-11-01 | End: 2017-11-04

## 2017-11-01 RX ORDER — HYDROMORPHONE HYDROCHLORIDE 2 MG/ML
0.5 INJECTION INTRAMUSCULAR; INTRAVENOUS; SUBCUTANEOUS
Qty: 0 | Refills: 0 | Status: DISCONTINUED | OUTPATIENT
Start: 2017-11-01 | End: 2017-11-04

## 2017-11-01 RX ORDER — MORPHINE SULFATE 50 MG/1
0.15 CAPSULE, EXTENDED RELEASE ORAL ONCE
Qty: 0 | Refills: 0 | Status: DISCONTINUED | OUTPATIENT
Start: 2017-11-01 | End: 2017-11-04

## 2017-11-01 RX ORDER — CEFAZOLIN SODIUM 1 G
2000 VIAL (EA) INJECTION ONCE
Qty: 0 | Refills: 0 | Status: COMPLETED | OUTPATIENT
Start: 2017-11-01 | End: 2017-11-01

## 2017-11-01 RX ORDER — SIMETHICONE 80 MG/1
80 TABLET, CHEWABLE ORAL EVERY 4 HOURS
Qty: 0 | Refills: 0 | Status: DISCONTINUED | OUTPATIENT
Start: 2017-11-01 | End: 2017-11-04

## 2017-11-01 RX ORDER — LANOLIN
1 OINTMENT (GRAM) TOPICAL
Qty: 0 | Refills: 0 | Status: DISCONTINUED | OUTPATIENT
Start: 2017-11-01 | End: 2017-11-04

## 2017-11-01 RX ORDER — OXYCODONE AND ACETAMINOPHEN 5; 325 MG/1; MG/1
2 TABLET ORAL EVERY 6 HOURS
Qty: 0 | Refills: 0 | Status: DISCONTINUED | OUTPATIENT
Start: 2017-11-01 | End: 2017-11-04

## 2017-11-01 RX ORDER — ACETAMINOPHEN 500 MG
650 TABLET ORAL EVERY 6 HOURS
Qty: 0 | Refills: 0 | Status: DISCONTINUED | OUTPATIENT
Start: 2017-11-01 | End: 2017-11-04

## 2017-11-01 RX ORDER — OXYCODONE AND ACETAMINOPHEN 5; 325 MG/1; MG/1
1 TABLET ORAL
Qty: 0 | Refills: 0 | Status: DISCONTINUED | OUTPATIENT
Start: 2017-11-01 | End: 2017-11-04

## 2017-11-01 RX ORDER — ONDANSETRON 8 MG/1
4 TABLET, FILM COATED ORAL EVERY 6 HOURS
Qty: 0 | Refills: 0 | Status: DISCONTINUED | OUTPATIENT
Start: 2017-11-01 | End: 2017-11-04

## 2017-11-01 RX ORDER — OXYTOCIN 10 UNIT/ML
41.67 VIAL (ML) INJECTION
Qty: 20 | Refills: 0 | Status: DISCONTINUED | OUTPATIENT
Start: 2017-11-01 | End: 2017-11-04

## 2017-11-01 RX ADMIN — Medication 100 MILLIGRAM(S): at 09:18

## 2017-11-01 RX ADMIN — HYDROMORPHONE HYDROCHLORIDE 0.5 MILLIGRAM(S): 2 INJECTION INTRAMUSCULAR; INTRAVENOUS; SUBCUTANEOUS at 12:02

## 2017-11-01 RX ADMIN — HYDROMORPHONE HYDROCHLORIDE 0.5 MILLIGRAM(S): 2 INJECTION INTRAMUSCULAR; INTRAVENOUS; SUBCUTANEOUS at 13:01

## 2017-11-01 RX ADMIN — HEPARIN SODIUM 5000 UNIT(S): 5000 INJECTION INTRAVENOUS; SUBCUTANEOUS at 18:31

## 2017-11-01 RX ADMIN — Medication 125 MILLIUNIT(S)/MIN: at 11:45

## 2017-11-01 RX ADMIN — HYDROMORPHONE HYDROCHLORIDE 0.5 MILLIGRAM(S): 2 INJECTION INTRAMUSCULAR; INTRAVENOUS; SUBCUTANEOUS at 13:30

## 2017-11-01 RX ADMIN — Medication 600 MILLIGRAM(S): at 16:57

## 2017-11-01 RX ADMIN — Medication 30 MILLILITER(S): at 09:00

## 2017-11-01 RX ADMIN — SODIUM CHLORIDE 2000 MILLILITER(S): 9 INJECTION, SOLUTION INTRAVENOUS at 06:58

## 2017-11-01 RX ADMIN — Medication 600 MILLIGRAM(S): at 17:52

## 2017-11-02 LAB
BASOPHILS # BLD AUTO: 0.1 K/UL — SIGNIFICANT CHANGE UP (ref 0–0.2)
BASOPHILS NFR BLD AUTO: 0.6 % — SIGNIFICANT CHANGE UP (ref 0–2)
BILIRUB UR QL STRIP: NORMAL
CLARITY UR: CLEAR
COLLECTION METHOD: NORMAL
EOSINOPHIL # BLD AUTO: 0.3 K/UL — SIGNIFICANT CHANGE UP (ref 0–0.5)
EOSINOPHIL NFR BLD AUTO: 2.5 % — SIGNIFICANT CHANGE UP (ref 0–6)
GLUCOSE UR-MCNC: NORMAL
HCG UR QL: 0.2 EU/DL
HCT VFR BLD CALC: 32.6 % — LOW (ref 34.5–45)
HGB BLD-MCNC: 10.7 G/DL — LOW (ref 11.5–15.5)
HGB UR QL STRIP.AUTO: NORMAL
KETONES UR-MCNC: NORMAL
LEUKOCYTE ESTERASE UR QL STRIP: NORMAL
LYMPHOCYTES # BLD AUTO: 1.5 K/UL — SIGNIFICANT CHANGE UP (ref 1–3.3)
LYMPHOCYTES # BLD AUTO: 10.8 % — LOW (ref 13–44)
MCHC RBC-ENTMCNC: 27 PG — SIGNIFICANT CHANGE UP (ref 27–34)
MCHC RBC-ENTMCNC: 32.8 GM/DL — SIGNIFICANT CHANGE UP (ref 32–36)
MCV RBC AUTO: 82.5 FL — SIGNIFICANT CHANGE UP (ref 80–100)
MONOCYTES # BLD AUTO: 0.7 K/UL — SIGNIFICANT CHANGE UP (ref 0–0.9)
MONOCYTES NFR BLD AUTO: 5.3 % — SIGNIFICANT CHANGE UP (ref 2–14)
NEUTROPHILS # BLD AUTO: 10.9 K/UL — HIGH (ref 1.8–7.4)
NEUTROPHILS NFR BLD AUTO: 80.8 % — HIGH (ref 43–77)
NITRITE UR QL STRIP: NORMAL
PH UR STRIP: 7
PLATELET # BLD AUTO: 226 K/UL — SIGNIFICANT CHANGE UP (ref 150–400)
PROT UR STRIP-MCNC: NORMAL
RBC # BLD: 3.95 M/UL — SIGNIFICANT CHANGE UP (ref 3.8–5.2)
RBC # FLD: 12.9 % — SIGNIFICANT CHANGE UP (ref 10.3–14.5)
SP GR UR STRIP: 1.01
WBC # BLD: 13.5 K/UL — HIGH (ref 3.8–10.5)
WBC # FLD AUTO: 13.5 K/UL — HIGH (ref 3.8–10.5)

## 2017-11-02 RX ORDER — MAGNESIUM HYDROXIDE 400 MG/1
30 TABLET, CHEWABLE ORAL DAILY
Qty: 0 | Refills: 0 | Status: DISCONTINUED | OUTPATIENT
Start: 2017-11-02 | End: 2017-11-04

## 2017-11-02 RX ADMIN — Medication 600 MILLIGRAM(S): at 00:33

## 2017-11-02 RX ADMIN — SIMETHICONE 80 MILLIGRAM(S): 80 TABLET, CHEWABLE ORAL at 07:55

## 2017-11-02 RX ADMIN — Medication 600 MILLIGRAM(S): at 06:30

## 2017-11-02 RX ADMIN — SIMETHICONE 80 MILLIGRAM(S): 80 TABLET, CHEWABLE ORAL at 16:42

## 2017-11-02 RX ADMIN — MAGNESIUM HYDROXIDE 30 MILLILITER(S): 400 TABLET, CHEWABLE ORAL at 18:42

## 2017-11-02 RX ADMIN — Medication 600 MILLIGRAM(S): at 22:30

## 2017-11-02 RX ADMIN — SIMETHICONE 80 MILLIGRAM(S): 80 TABLET, CHEWABLE ORAL at 22:56

## 2017-11-02 RX ADMIN — Medication 1 TABLET(S): at 11:53

## 2017-11-02 RX ADMIN — HEPARIN SODIUM 5000 UNIT(S): 5000 INJECTION INTRAVENOUS; SUBCUTANEOUS at 06:30

## 2017-11-02 RX ADMIN — OXYCODONE AND ACETAMINOPHEN 1 TABLET(S): 5; 325 TABLET ORAL at 15:27

## 2017-11-02 RX ADMIN — OXYCODONE AND ACETAMINOPHEN 1 TABLET(S): 5; 325 TABLET ORAL at 07:48

## 2017-11-02 RX ADMIN — OXYCODONE AND ACETAMINOPHEN 2 TABLET(S): 5; 325 TABLET ORAL at 19:30

## 2017-11-02 RX ADMIN — OXYCODONE AND ACETAMINOPHEN 1 TABLET(S): 5; 325 TABLET ORAL at 08:00

## 2017-11-02 RX ADMIN — OXYCODONE AND ACETAMINOPHEN 1 TABLET(S): 5; 325 TABLET ORAL at 03:25

## 2017-11-02 RX ADMIN — Medication 600 MILLIGRAM(S): at 12:22

## 2017-11-02 RX ADMIN — OXYCODONE AND ACETAMINOPHEN 1 TABLET(S): 5; 325 TABLET ORAL at 11:52

## 2017-11-02 RX ADMIN — Medication 100 MILLIGRAM(S): at 21:55

## 2017-11-02 RX ADMIN — OXYCODONE AND ACETAMINOPHEN 2 TABLET(S): 5; 325 TABLET ORAL at 18:36

## 2017-11-02 RX ADMIN — HEPARIN SODIUM 5000 UNIT(S): 5000 INJECTION INTRAVENOUS; SUBCUTANEOUS at 18:21

## 2017-11-02 RX ADMIN — SIMETHICONE 80 MILLIGRAM(S): 80 TABLET, CHEWABLE ORAL at 12:23

## 2017-11-02 RX ADMIN — Medication 600 MILLIGRAM(S): at 13:00

## 2017-11-02 RX ADMIN — OXYCODONE AND ACETAMINOPHEN 1 TABLET(S): 5; 325 TABLET ORAL at 16:59

## 2017-11-02 RX ADMIN — Medication 325 MILLIGRAM(S): at 11:53

## 2017-11-02 RX ADMIN — OXYCODONE AND ACETAMINOPHEN 1 TABLET(S): 5; 325 TABLET ORAL at 12:27

## 2017-11-02 RX ADMIN — Medication 600 MILLIGRAM(S): at 08:00

## 2017-11-02 RX ADMIN — Medication 100 MILLIGRAM(S): at 08:33

## 2017-11-02 RX ADMIN — Medication 600 MILLIGRAM(S): at 21:55

## 2017-11-03 RX ADMIN — Medication 600 MILLIGRAM(S): at 11:57

## 2017-11-03 RX ADMIN — SIMETHICONE 80 MILLIGRAM(S): 80 TABLET, CHEWABLE ORAL at 17:16

## 2017-11-03 RX ADMIN — HEPARIN SODIUM 5000 UNIT(S): 5000 INJECTION INTRAVENOUS; SUBCUTANEOUS at 06:44

## 2017-11-03 RX ADMIN — Medication 1 SUPPOSITORY(S): at 21:44

## 2017-11-03 RX ADMIN — OXYCODONE AND ACETAMINOPHEN 2 TABLET(S): 5; 325 TABLET ORAL at 14:00

## 2017-11-03 RX ADMIN — Medication 100 MILLIGRAM(S): at 23:50

## 2017-11-03 RX ADMIN — Medication 100 MILLIGRAM(S): at 11:19

## 2017-11-03 RX ADMIN — Medication 325 MILLIGRAM(S): at 11:19

## 2017-11-03 RX ADMIN — OXYCODONE AND ACETAMINOPHEN 1 TABLET(S): 5; 325 TABLET ORAL at 23:00

## 2017-11-03 RX ADMIN — Medication 600 MILLIGRAM(S): at 23:49

## 2017-11-03 RX ADMIN — OXYCODONE AND ACETAMINOPHEN 1 TABLET(S): 5; 325 TABLET ORAL at 18:48

## 2017-11-03 RX ADMIN — HEPARIN SODIUM 5000 UNIT(S): 5000 INJECTION INTRAVENOUS; SUBCUTANEOUS at 18:41

## 2017-11-03 RX ADMIN — OXYCODONE AND ACETAMINOPHEN 1 TABLET(S): 5; 325 TABLET ORAL at 21:55

## 2017-11-03 RX ADMIN — Medication 600 MILLIGRAM(S): at 11:19

## 2017-11-03 RX ADMIN — OXYCODONE AND ACETAMINOPHEN 2 TABLET(S): 5; 325 TABLET ORAL at 01:00

## 2017-11-03 RX ADMIN — OXYCODONE AND ACETAMINOPHEN 1 TABLET(S): 5; 325 TABLET ORAL at 19:24

## 2017-11-03 RX ADMIN — MAGNESIUM HYDROXIDE 30 MILLILITER(S): 400 TABLET, CHEWABLE ORAL at 17:21

## 2017-11-03 RX ADMIN — SIMETHICONE 80 MILLIGRAM(S): 80 TABLET, CHEWABLE ORAL at 23:49

## 2017-11-03 RX ADMIN — Medication 600 MILLIGRAM(S): at 18:00

## 2017-11-03 RX ADMIN — Medication 600 MILLIGRAM(S): at 17:16

## 2017-11-03 RX ADMIN — Medication 1 TABLET(S): at 11:19

## 2017-11-03 RX ADMIN — OXYCODONE AND ACETAMINOPHEN 2 TABLET(S): 5; 325 TABLET ORAL at 00:37

## 2017-11-03 RX ADMIN — SIMETHICONE 80 MILLIGRAM(S): 80 TABLET, CHEWABLE ORAL at 04:00

## 2017-11-03 RX ADMIN — OXYCODONE AND ACETAMINOPHEN 1 TABLET(S): 5; 325 TABLET ORAL at 13:10

## 2017-11-03 RX ADMIN — OXYCODONE AND ACETAMINOPHEN 2 TABLET(S): 5; 325 TABLET ORAL at 06:45

## 2017-11-03 RX ADMIN — OXYCODONE AND ACETAMINOPHEN 2 TABLET(S): 5; 325 TABLET ORAL at 07:30

## 2017-11-03 RX ADMIN — SIMETHICONE 80 MILLIGRAM(S): 80 TABLET, CHEWABLE ORAL at 07:52

## 2017-11-03 RX ADMIN — Medication 600 MILLIGRAM(S): at 04:00

## 2017-11-03 NOTE — PROGRESS NOTE ADULT - ASSESSMENT
POD#2 s/p  stable.  Pt recovering well, requesting circumcision for her son. DC planning for tomorrow . POD#2 s/p  stable.  Pt recovering well, requesting circumcision for her son. DC planning for tomorrow .  ATTENDING NOTE   Patient was seen and examined at approx.  9:30 am.  I agree with above resident assessment and plan.  KURTIS Antonio MD

## 2017-11-03 NOTE — PROGRESS NOTE ADULT - PROBLEM SELECTOR PLAN 1
Continue regular diet  OOB/ambulate  Continue routine post-partum care  DC planning for tomorrow 11/4/17

## 2017-11-04 ENCOUNTER — TRANSCRIPTION ENCOUNTER (OUTPATIENT)
Age: 32
End: 2017-11-04

## 2017-11-04 VITALS
DIASTOLIC BLOOD PRESSURE: 67 MMHG | SYSTOLIC BLOOD PRESSURE: 107 MMHG | HEART RATE: 66 BPM | TEMPERATURE: 98 F | OXYGEN SATURATION: 98 % | RESPIRATION RATE: 16 BRPM

## 2017-11-04 RX ORDER — DOCUSATE SODIUM 100 MG
1 CAPSULE ORAL
Qty: 0 | Refills: 0 | DISCHARGE
Start: 2017-11-04

## 2017-11-04 RX ORDER — IBUPROFEN 200 MG
1 TABLET ORAL
Qty: 0 | Refills: 0 | DISCHARGE
Start: 2017-11-04

## 2017-11-04 RX ADMIN — Medication 325 MILLIGRAM(S): at 11:37

## 2017-11-04 RX ADMIN — OXYCODONE AND ACETAMINOPHEN 1 TABLET(S): 5; 325 TABLET ORAL at 04:38

## 2017-11-04 RX ADMIN — Medication 600 MILLIGRAM(S): at 06:00

## 2017-11-04 RX ADMIN — Medication 100 MILLIGRAM(S): at 07:31

## 2017-11-04 RX ADMIN — OXYCODONE AND ACETAMINOPHEN 1 TABLET(S): 5; 325 TABLET ORAL at 14:00

## 2017-11-04 RX ADMIN — SIMETHICONE 80 MILLIGRAM(S): 80 TABLET, CHEWABLE ORAL at 05:09

## 2017-11-04 RX ADMIN — Medication 1 TABLET(S): at 14:01

## 2017-11-04 RX ADMIN — Medication 600 MILLIGRAM(S): at 11:37

## 2017-11-04 RX ADMIN — HEPARIN SODIUM 5000 UNIT(S): 5000 INJECTION INTRAVENOUS; SUBCUTANEOUS at 05:09

## 2017-11-04 RX ADMIN — OXYCODONE AND ACETAMINOPHEN 1 TABLET(S): 5; 325 TABLET ORAL at 07:30

## 2017-11-04 RX ADMIN — Medication 600 MILLIGRAM(S): at 05:08

## 2017-11-04 RX ADMIN — OXYCODONE AND ACETAMINOPHEN 1 TABLET(S): 5; 325 TABLET ORAL at 10:40

## 2017-11-04 RX ADMIN — Medication 600 MILLIGRAM(S): at 00:00

## 2017-11-04 RX ADMIN — OXYCODONE AND ACETAMINOPHEN 1 TABLET(S): 5; 325 TABLET ORAL at 03:11

## 2017-11-04 NOTE — DISCHARGE NOTE OB - CARE PROVIDER_API CALL
Jorge Bacon), Obstetrics and Gynecology  [address], [place]   Phone: [phone]  Fax: [phone]

## 2017-11-04 NOTE — PROGRESS NOTE ADULT - SUBJECTIVE AND OBJECTIVE BOX
32y  Female POD#  s/p RCS  S: Patient is doing well and has no acute complaints. Pain is well controlled with medications. Patient is tolerating regular diet. She is OOB and urinating w/o any difficulties.  Denies bowel movement at this time. Patient is breast and bottle feeding.     O: Vital Signs Last 24 Hrs  T(C): 36.7 (04 Nov 2017 07:00), Max: 36.7 (03 Nov 2017 20:40)  T(F): 98 (04 Nov 2017 07:00), Max: 98.1 (03 Nov 2017 20:40)  HR: 66 (04 Nov 2017 07:00) (64 - 71)  BP: 107/67 (04 Nov 2017 07:00) (92/54 - 107/67)  BP(mean): --  RR: 16 (04 Nov 2017 07:00) (16 - 17)  SpO2: 98% (04 Nov 2017 07:00) (98% - 99%)  Appears well, resting in bed  Abdo: Fundus firm w/o tenderness   Incision: healing well  PV: No calf tenderness, edema.
 Section, POD#1    Pt is now POD#1 S/P  Section    She is ambulating without difficulty, tolerating a reg diet, and denies N/V.  No C/O dizziness, no FRANK.  +Voiding without difficulty.      On exam, pt appears well.  VSS, afebrile.    Vital Signs Last 24 Hrs  T(C): 36.7 (2017 07:57), Max: 36.9 (2017 18:27)  T(F): 98 (2017 07:57), Max: 98.4 (2017 18:27)  HR: 74 (2017 07:57) (68 - 83)  BP: 97/60 (2017 07:57) (90/54 - 115/72)  BP(mean): --  RR: 16 (2017 07:57) (14 - 20)  SpO2: 98% (2017 07:57) (95% - 100%)    Breasts soft, nontender, nonengorged.  Abdomen: Soft, nontender, nondistended , firm uterine fundus.  Incision clean, dry, intact with staples.  Pelvic: Normal lochia noted  Ext: No DVT tenderness, normal pulses, edema WNL for C/S POD#1.    LABS:                        10.7   13.5  )-----------( 226      ( 2017 07:22 )             32.6                 Allergies    No Known Allergies    Intolerances      MEDICATIONS  (STANDING):  diphtheria/tetanus/pertussis (acellular) Vaccine (ADAcel) 0.5 milliLiter(s) IntraMuscular once  ferrous    sulfate 325 milliGRAM(s) Oral daily  heparin  Injectable 5000 Unit(s) SubCutaneous every 12 hours  lactated ringers. 1000 milliLiter(s) (125 mL/Hr) IV Continuous <Continuous>  lactated ringers. 1000 milliLiter(s) (125 mL/Hr) IV Continuous <Continuous>  morphine PF Spinal 0.15 milliGRAM(s) IntraThecal. once  oxytocin Infusion 41.667 milliUNIT(s)/Min (125 mL/Hr) IV Continuous <Continuous>  prenatal multivitamin 1 Tablet(s) Oral daily    MEDICATIONS  (PRN):  acetaminophen   Tablet 650 milliGRAM(s) Oral every 6 hours PRN For Temp greater than 38.5 C (101.3 F)  acetaminophen   Tablet. 650 milliGRAM(s) Oral every 6 hours PRN Mild Pain (1 - 3)  diphenhydrAMINE   Capsule 25 milliGRAM(s) Oral every 6 hours PRN Itching  docusate sodium 100 milliGRAM(s) Oral two times a day PRN Stool Softening  glycerin Suppository - Adult 1 Suppository(s) Rectal at bedtime PRN Constipation  HYDROmorphone  Injectable 1.5 milliGRAM(s) SubCutaneous every 3 hours PRN Severe Pain  HYDROmorphone  Injectable 0.5 milliGRAM(s) IV Push every 10 minutes PRN Moderate Pain  ibuprofen  Tablet 600 milliGRAM(s) Oral every 6 hours PRN Mild pain or headache  lanolin Ointment 1 Application(s) Topical every 3 hours PRN Sore Nipples  metoclopramide Injectable 10 milliGRAM(s) IV Push once PRN Nausea and/or Vomiting  naloxone Injectable 0.1 milliGRAM(s) IV Push every 3 minutes PRN For ANY of the following changes in patient status:  A. RR LESS THAN 10 breaths per minute, B. Oxygen saturation LESS THAN 90%, C. Sedation score of 6  ondansetron Injectable 4 milliGRAM(s) IV Push every 6 hours PRN Nausea  oxyCODONE    5 mG/acetaminophen 325 mG 1 Tablet(s) Oral every 3 hours PRN Moderate Pain  oxyCODONE    5 mG/acetaminophen 325 mG 2 Tablet(s) Oral every 6 hours PRN Severe Pain  simethicone 80 milliGRAM(s) Chew every 4 hours PRN Gas      POD#1 S/P       stable.  Pt recovering well.  Signs and symptoms normal /  abnormal post op courses reviewed.  Plan to cont reg diet, OOB,  pain management as per anesthesia at this time.  Will change to
Pt is now POD#2 s/p     She is ambulating without difficulty, tolerating a reg diet, and denies N/V.  +Voiding without difficulty, passing flatus, no BMs yet. Pt is exclusively breastfeeding baby.    On exam, pt appears well.  VSS, afebrile.    Vital Signs Last 24 Hrs  T(C): 36.7 (17 @ 08:12)  T(F): 98 (17 @ 08:12), Max: 98.5 (17 @ 11:44)  HR: 77 (17 @ 08:12) (71 - 99)  BP: 98/68 (17 @ 08:12)  BP(mean): 63 (17 @ 11:44) (63 - 63)  RR: 16 (17 @ 08:12) (16 - 17)  SpO2: 98% (17 @ 08:12) (97% - 100%)  Wt(kg): --     @ 07:01  -   @ 07:00  --------------------------------------------------------  IN: 240 mL / OUT: 450 mL / NET: -210 mL    Physical exam  Gen - AOx3, NAD  Abdomen: Soft, nontender, nondistended , firm uterine fundus ~4 fingerbreadths below umbilicus.  Incision clean, dry, intact with staples. No erythema/edema.  Pelvic: Normal lochia noted  Ext: No DVT tenderness/edema    LABS:                        10.7   13.5  )-----------( 226      ( 2017 07:22 )             32.6

## 2017-11-04 NOTE — DISCHARGE NOTE OB - CARE PLAN
Principal Discharge DX:	Delivered by  section  Goal:	Discharge home  Instructions for follow-up, activity and diet:	Routine post-op discharge instructions

## 2017-11-04 NOTE — PROGRESS NOTE ADULT - ASSESSMENT
s/p [facility] POD#3  Doing well  -Discharge home today  -R/B of circumcision explained - informed consent obtained  -Routine post-operative discharge instructions given

## 2017-11-04 NOTE — DISCHARGE NOTE OB - MEDICATION SUMMARY - MEDICATIONS TO TAKE
I will START or STAY ON the medications listed below when I get home from the hospital:    ibuprofen 600 mg oral tablet  -- 1 tab(s) by mouth every 6 hours, As needed, Mild pain or headache  -- Indication: For REPEAT C SECTION    oxyCODONE-acetaminophen 5 mg-325 mg oral tablet  -- 1 tab(s) by mouth every 3 hours, As needed, Moderate Pain MDD:6  -- Indication: For REPEAT C SECTION    SELECT-OB+   CLARENCE DHA  -- Indication: For REPEAT C SECTION    docusate sodium 100 mg oral capsule  -- 1 cap(s) by mouth 2 times a day, As needed, Stool Softening  -- Indication: For REPEAT C SECTION

## 2017-11-07 DIAGNOSIS — O34.211 MATERNAL CARE FOR LOW TRANSVERSE SCAR FROM PREVIOUS CESAREAN DELIVERY: ICD-10-CM

## 2017-11-07 DIAGNOSIS — Z3A.39 39 WEEKS GESTATION OF PREGNANCY: ICD-10-CM

## 2017-11-08 ENCOUNTER — APPOINTMENT (OUTPATIENT)
Dept: OBGYN | Facility: CLINIC | Age: 32
End: 2017-11-08
Payer: COMMERCIAL

## 2017-11-08 PROCEDURE — 10140 I&D HMTMA SEROMA/FLUID COLLJ: CPT | Mod: 58

## 2017-11-08 PROCEDURE — 99024 POSTOP FOLLOW-UP VISIT: CPT

## 2017-11-09 ENCOUNTER — APPOINTMENT (OUTPATIENT)
Dept: OBGYN | Facility: CLINIC | Age: 32
End: 2017-11-09
Payer: COMMERCIAL

## 2017-11-09 VITALS
WEIGHT: 145 LBS | SYSTOLIC BLOOD PRESSURE: 110 MMHG | BODY MASS INDEX: 24.16 KG/M2 | DIASTOLIC BLOOD PRESSURE: 70 MMHG | HEIGHT: 65 IN

## 2017-11-09 PROCEDURE — 10140 I&D HMTMA SEROMA/FLUID COLLJ: CPT | Mod: 58

## 2017-11-09 PROCEDURE — 99024 POSTOP FOLLOW-UP VISIT: CPT

## 2017-11-14 ENCOUNTER — APPOINTMENT (OUTPATIENT)
Dept: OBGYN | Facility: CLINIC | Age: 32
End: 2017-11-14
Payer: COMMERCIAL

## 2017-11-14 VITALS — TEMPERATURE: 97.7 F | DIASTOLIC BLOOD PRESSURE: 70 MMHG | SYSTOLIC BLOOD PRESSURE: 115 MMHG

## 2017-11-14 PROCEDURE — 99024 POSTOP FOLLOW-UP VISIT: CPT

## 2017-11-16 ENCOUNTER — APPOINTMENT (OUTPATIENT)
Dept: OBGYN | Facility: CLINIC | Age: 32
End: 2017-11-16

## 2017-11-16 LAB
BASOPHILS # BLD AUTO: 0.06 K/UL
BASOPHILS NFR BLD AUTO: 0.9 %
EOSINOPHIL # BLD AUTO: 0.45 K/UL
EOSINOPHIL NFR BLD AUTO: 6.7 %
HCT VFR BLD CALC: 41.2 %
HGB BLD-MCNC: 12.5 G/DL
IMM GRANULOCYTES NFR BLD AUTO: 0.1 %
LYMPHOCYTES # BLD AUTO: 1.51 K/UL
LYMPHOCYTES NFR BLD AUTO: 22.5 %
MAN DIFF?: NORMAL
MCHC RBC-ENTMCNC: 26.1 PG
MCHC RBC-ENTMCNC: 30.3 GM/DL
MCV RBC AUTO: 86 FL
MONOCYTES # BLD AUTO: 0.36 K/UL
MONOCYTES NFR BLD AUTO: 5.4 %
NEUTROPHILS # BLD AUTO: 4.33 K/UL
NEUTROPHILS NFR BLD AUTO: 64.4 %
PLATELET # BLD AUTO: 447 K/UL
RBC # BLD: 4.79 M/UL
RBC # FLD: 14.9 %
WBC # FLD AUTO: 6.72 K/UL

## 2017-11-20 ENCOUNTER — APPOINTMENT (OUTPATIENT)
Dept: OBGYN | Facility: CLINIC | Age: 32
End: 2017-11-20
Payer: COMMERCIAL

## 2017-11-20 PROCEDURE — 99024 POSTOP FOLLOW-UP VISIT: CPT

## 2017-12-18 ENCOUNTER — APPOINTMENT (OUTPATIENT)
Dept: OBGYN | Facility: CLINIC | Age: 32
End: 2017-12-18
Payer: COMMERCIAL

## 2017-12-18 VITALS — WEIGHT: 132 LBS | DIASTOLIC BLOOD PRESSURE: 68 MMHG | SYSTOLIC BLOOD PRESSURE: 105 MMHG | BODY MASS INDEX: 21.97 KG/M2

## 2017-12-18 DIAGNOSIS — Z30.011 ENCOUNTER FOR INITIAL PRESCRIPTION OF CONTRACEPTIVE PILLS: ICD-10-CM

## 2017-12-18 PROCEDURE — 0503F POSTPARTUM CARE VISIT: CPT

## 2017-12-19 PROBLEM — Z30.011 ORAL CONTRACEPTIVE PRESCRIBED: Status: ACTIVE | Noted: 2017-12-19

## 2018-04-23 ENCOUNTER — RX RENEWAL (OUTPATIENT)
Age: 33
End: 2018-04-23

## 2018-06-01 ENCOUNTER — RX RENEWAL (OUTPATIENT)
Age: 33
End: 2018-06-01

## 2018-06-04 ENCOUNTER — MEDICATION RENEWAL (OUTPATIENT)
Age: 33
End: 2018-06-04

## 2018-06-07 ENCOUNTER — RX RENEWAL (OUTPATIENT)
Age: 33
End: 2018-06-07

## 2018-06-11 ENCOUNTER — RX RENEWAL (OUTPATIENT)
Age: 33
End: 2018-06-11

## 2018-06-18 ENCOUNTER — APPOINTMENT (OUTPATIENT)
Dept: OBGYN | Facility: CLINIC | Age: 33
End: 2018-06-18
Payer: COMMERCIAL

## 2018-06-18 VITALS
DIASTOLIC BLOOD PRESSURE: 70 MMHG | WEIGHT: 123 LBS | BODY MASS INDEX: 20.49 KG/M2 | HEIGHT: 65 IN | SYSTOLIC BLOOD PRESSURE: 110 MMHG

## 2018-06-18 DIAGNOSIS — Z01.419 ENCOUNTER FOR GYNECOLOGICAL EXAMINATION (GENERAL) (ROUTINE) W/OUT ABNORMAL FINDINGS: ICD-10-CM

## 2018-06-18 PROCEDURE — 99395 PREV VISIT EST AGE 18-39: CPT

## 2018-06-19 LAB — HPV HIGH+LOW RISK DNA PNL CVX: NOT DETECTED

## 2018-06-20 LAB — CYTOLOGY CVX/VAG DOC THIN PREP: NORMAL

## 2018-09-13 ENCOUNTER — APPOINTMENT (OUTPATIENT)
Dept: NEUROLOGY | Facility: CLINIC | Age: 33
End: 2018-09-13

## 2018-12-17 ENCOUNTER — RX RENEWAL (OUTPATIENT)
Age: 33
End: 2018-12-17

## 2018-12-19 ENCOUNTER — APPOINTMENT (OUTPATIENT)
Dept: OBGYN | Facility: CLINIC | Age: 33
End: 2018-12-19
Payer: COMMERCIAL

## 2018-12-19 VITALS
BODY MASS INDEX: 20.33 KG/M2 | DIASTOLIC BLOOD PRESSURE: 66 MMHG | HEART RATE: 70 BPM | RESPIRATION RATE: 16 BRPM | SYSTOLIC BLOOD PRESSURE: 106 MMHG | WEIGHT: 122 LBS | HEIGHT: 65 IN

## 2018-12-19 DIAGNOSIS — G43.829 MENSTRUAL MIGRAINE, NOT INTRACTABLE, W/OUT STATUS MIGRAINOSUS: ICD-10-CM

## 2018-12-19 PROCEDURE — 99214 OFFICE O/P EST MOD 30 MIN: CPT

## 2019-01-18 ENCOUNTER — APPOINTMENT (OUTPATIENT)
Dept: OBGYN | Facility: CLINIC | Age: 34
End: 2019-01-18
Payer: COMMERCIAL

## 2019-01-18 VITALS
SYSTOLIC BLOOD PRESSURE: 100 MMHG | DIASTOLIC BLOOD PRESSURE: 78 MMHG | WEIGHT: 126 LBS | BODY MASS INDEX: 20.99 KG/M2 | HEIGHT: 65 IN

## 2019-01-18 DIAGNOSIS — Z87.42 PERSONAL HISTORY OF OTHER DISEASES OF THE FEMALE GENITAL TRACT: ICD-10-CM

## 2019-01-18 PROCEDURE — 76830 TRANSVAGINAL US NON-OB: CPT

## 2019-01-18 PROCEDURE — 99213 OFFICE O/P EST LOW 20 MIN: CPT | Mod: 25

## 2019-01-18 PROCEDURE — 81025 URINE PREGNANCY TEST: CPT

## 2019-01-18 RX ORDER — ONDANSETRON 8 MG/1
8 TABLET, ORALLY DISINTEGRATING ORAL
Qty: 10 | Refills: 2 | Status: ACTIVE | COMMUNITY
Start: 2019-01-18 | End: 1900-01-01

## 2019-01-18 NOTE — PROCEDURE
[Intrauterine Pregnancy] : intrauterine pregnancy [Yolk Sac] : yolk sac present [Fetal Heart] : fetal heart present [CRL: ___ (mm)] : CRL - [unfilled]Umm [Date: ___] : EDC: [unfilled] [Current GA by Sonogram: ___ (wks)] : Current GA by Sonogram: [unfilled]Uwks [___ day(s)] : [unfilled] days [WNL] : Transvaginal OB Sonogram WNL [FreeTextEntry1] : A transvaginal OB ultrasound was performed: A viable pierce intrauterine gestation is seen, fetal heart motion is noted, crown-rump length was consistent with 6 weeks and one day gestation.

## 2019-01-18 NOTE — CHIEF COMPLAINT
[Follow Up] : follow up GYN visit [FreeTextEntry1] : Patient is a 33-year-old female presents with complaints of amenorrhea. The patient states that she has a positive UCG. Patient's last menstrual period was December 3, 2018 which would make her 6-1/2 weeks pregnant. With an estimated date of confinement 2019. Patient with a history of a  section in 2017. Post-Care Instructions: Patient instructed to not lie down for 4 hours and limit physical activity for 24 hours. Patient instructed not to travel by airplane for 48 hours.

## 2019-01-22 ENCOUNTER — TRANSCRIPTION ENCOUNTER (OUTPATIENT)
Age: 34
End: 2019-01-22

## 2019-01-22 ENCOUNTER — APPOINTMENT (OUTPATIENT)
Dept: OBGYN | Facility: CLINIC | Age: 34
End: 2019-01-22
Payer: COMMERCIAL

## 2019-01-22 VITALS
DIASTOLIC BLOOD PRESSURE: 80 MMHG | HEIGHT: 65 IN | WEIGHT: 125 LBS | BODY MASS INDEX: 20.83 KG/M2 | SYSTOLIC BLOOD PRESSURE: 110 MMHG

## 2019-01-22 LAB — HCG UR QL: POSITIVE

## 2019-01-22 PROCEDURE — 36415 COLL VENOUS BLD VENIPUNCTURE: CPT

## 2019-01-22 PROCEDURE — 76830 TRANSVAGINAL US NON-OB: CPT

## 2019-01-22 PROCEDURE — 99214 OFFICE O/P EST MOD 30 MIN: CPT | Mod: 25

## 2019-01-22 NOTE — PHYSICAL EXAM
[Normal] : uterus [No Bleeding] : there was no active vaginal bleeding [Uterine Adnexae] : were not tender and not enlarged [FreeTextEntry4] : no bleeding noted

## 2019-01-22 NOTE — PROCEDURE
[Intrauterine Pregnancy] : intrauterine pregnancy [Yolk Sac] : yolk sac present [Fetal Heart] : fetal heart present [Current GA by Sonogram: ___ (wks)] : Current GA by Sonogram: [unfilled]Uwks [___ day(s)] : [unfilled] days [WNL] : Transvaginal OB Sonogram - abnormalities noted [FreeTextEntry1] : A transvaginal OB ultrasound was performed: A viable pierce intrauterine gestation as noted, and fetal heart motion is noted, crown-rump length was consistent with 7 weeks and 3 days. This correlates with the patient's dates. A small area in the posterior fundal region of the uterus is consistent with a possible subchorionic hemorrhage.cervix appears long and closed

## 2019-01-26 LAB
BASOPHILS # BLD AUTO: 0.03 K/UL
BASOPHILS NFR BLD AUTO: 0.3 %
EOSINOPHIL # BLD AUTO: 0.24 K/UL
EOSINOPHIL NFR BLD AUTO: 2.3 %
HCT VFR BLD CALC: 39.1 %
HGB BLD-MCNC: 12.9 G/DL
IMM GRANULOCYTES NFR BLD AUTO: 0.2 %
LYMPHOCYTES # BLD AUTO: 1.85 K/UL
LYMPHOCYTES NFR BLD AUTO: 17.5 %
MAN DIFF?: NORMAL
MCHC RBC-ENTMCNC: 29 PG
MCHC RBC-ENTMCNC: 33 GM/DL
MCV RBC AUTO: 87.9 FL
MONOCYTES # BLD AUTO: 0.45 K/UL
MONOCYTES NFR BLD AUTO: 4.3 %
NEUTROPHILS # BLD AUTO: 7.97 K/UL
NEUTROPHILS NFR BLD AUTO: 75.4 %
PLATELET # BLD AUTO: 273 K/UL
PROGEST SERPL-MCNC: 18 NG/ML
RBC # BLD: 4.45 M/UL
RBC # FLD: 13.9 %
WBC # FLD AUTO: 10.56 K/UL

## 2019-02-06 ENCOUNTER — APPOINTMENT (OUTPATIENT)
Dept: OBGYN | Facility: CLINIC | Age: 34
End: 2019-02-06
Payer: COMMERCIAL

## 2019-02-06 ENCOUNTER — NON-APPOINTMENT (OUTPATIENT)
Age: 34
End: 2019-02-06

## 2019-02-06 VITALS
WEIGHT: 125 LBS | HEIGHT: 65 IN | HEART RATE: 70 BPM | SYSTOLIC BLOOD PRESSURE: 104 MMHG | BODY MASS INDEX: 20.83 KG/M2 | DIASTOLIC BLOOD PRESSURE: 64 MMHG | OXYGEN SATURATION: 98 % | RESPIRATION RATE: 16 BRPM

## 2019-02-06 PROCEDURE — 36415 COLL VENOUS BLD VENIPUNCTURE: CPT

## 2019-02-06 PROCEDURE — 76817 TRANSVAGINAL US OBSTETRIC: CPT

## 2019-02-06 PROCEDURE — 99215 OFFICE O/P EST HI 40 MIN: CPT | Mod: 25

## 2019-02-06 NOTE — PROCEDURE
[Intrauterine Pregnancy] : intrauterine pregnancy [Yolk Sac] : yolk sac present [Fetal Heart] : fetal heart present [CRL: ___ (mm)] : CRL - [unfilled]Umm [Date: ___] : EDC: [unfilled] [Current GA by Sonogram: ___ (wks)] : Current GA by Sonogram: [unfilled]Uwks [___ day(s)] : [unfilled] days [WNL] : Transvaginal OB Sonogram WNL [FreeTextEntry1] : Transvaginal OB ultrasound performed: A viable pierce intrauterine gestation is seen, fetal heart motion is noted, crown-rump length was consistent with patient's dates

## 2019-02-07 LAB
ABO + RH PNL BLD: NORMAL
BASOPHILS # BLD AUTO: 0.03 K/UL
BASOPHILS NFR BLD AUTO: 0.3 %
BLD GP AB SCN SERPL QL: NORMAL
C TRACH RRNA SPEC QL NAA+PROBE: NOT DETECTED
CANDIDA VAG CYTO: NOT DETECTED
CMV IGG SERPL QL: 2.3 U/ML
CMV IGG SERPL-IMP: POSITIVE
CMV IGM SERPL QL: <8 AU/ML
CMV IGM SERPL QL: NEGATIVE
EOSINOPHIL # BLD AUTO: 0.19 K/UL
EOSINOPHIL NFR BLD AUTO: 2 %
G VAGINALIS+PREV SP MTYP VAG QL MICRO: NOT DETECTED
HBV SURFACE AG SER QL: NONREACTIVE
HCT VFR BLD CALC: 41.6 %
HCV AB SER QL: NONREACTIVE
HCV S/CO RATIO: 0.12 S/CO
HGB BLD-MCNC: 12.9 G/DL
HIV1+2 AB SPEC QL IA.RAPID: NONREACTIVE
HPV HIGH+LOW RISK DNA PNL CVX: NOT DETECTED
HSV 1+2 IGG SER IA-IMP: NEGATIVE
HSV 1+2 IGG SER IA-IMP: NEGATIVE
HSV1 IGG SER QL: <0.01 INDEX
HSV2 IGG SER QL: 0.08 INDEX
IMM GRANULOCYTES NFR BLD AUTO: 0.2 %
LYMPHOCYTES # BLD AUTO: 1.66 K/UL
LYMPHOCYTES NFR BLD AUTO: 17.4 %
MAN DIFF?: NORMAL
MCHC RBC-ENTMCNC: 28.7 PG
MCHC RBC-ENTMCNC: 31 GM/DL
MCV RBC AUTO: 92.7 FL
MEV IGG FLD QL IA: 173 AU/ML
MEV IGG+IGM SER-IMP: POSITIVE
MONOCYTES # BLD AUTO: 0.44 K/UL
MONOCYTES NFR BLD AUTO: 4.6 %
N GONORRHOEA RRNA SPEC QL NAA+PROBE: NOT DETECTED
NEUTROPHILS # BLD AUTO: 7.2 K/UL
NEUTROPHILS NFR BLD AUTO: 75.5 %
PLATELET # BLD AUTO: 271 K/UL
RBC # BLD: 4.49 M/UL
RBC # FLD: 14.4 %
RUBV IGG FLD-ACNC: 1.7 INDEX
RUBV IGG SER-IMP: POSITIVE
SOURCE TP AMPLIFICATION: NORMAL
T GONDII AB SER-IMP: NEGATIVE
T GONDII AB SER-IMP: NEGATIVE
T GONDII IGG SER QL: <3 IU/ML
T GONDII IGM SER QL: <3 AU/ML
T PALLIDUM AB SER QL IA: NEGATIVE
T VAGINALIS VAG QL WET PREP: NOT DETECTED
VZV AB TITR SER: POSITIVE
VZV IGG SER IF-ACNC: 183.1 INDEX
WBC # FLD AUTO: 9.54 K/UL

## 2019-02-08 LAB
B19V IGG SER QL IA: 7.6 INDEX
B19V IGG+IGM SER-IMP: NORMAL
B19V IGG+IGM SER-IMP: POSITIVE
B19V IGM FLD-ACNC: 1.8 INDEX
B19V IGM SER-ACNC: POSITIVE
HSV1 IGM SER QL: NORMAL TITER
HSV2 AB FLD-ACNC: NORMAL TITER
RUBV IGM FLD-ACNC: <20 AU/ML
T4 SERPL-MCNC: 9.5 UG/DL
TSH SERPL-ACNC: 0.76 UIU/ML

## 2019-02-11 LAB
AR GENE MUT ANL BLD/T: NEGATIVE
BACTERIA GENITAL AEROBE CULT: NORMAL
CFTR MUT TESTED BLD/T: NEGATIVE
HGB A MFR BLD: 97.3 %
HGB A2 MFR BLD: 2.7 %
HGB FRACT BLD-IMP: NORMAL

## 2019-02-12 LAB — CYTOLOGY CVX/VAG DOC THIN PREP: NORMAL

## 2019-02-13 LAB — FMR1 GENE MUT ANL BLD/T: NORMAL

## 2019-02-19 ENCOUNTER — CLINICAL ADVICE (OUTPATIENT)
Age: 34
End: 2019-02-19

## 2019-02-19 LAB
B19V IGG SER QL IA: 8.6 INDEX
B19V IGG+IGM SER-IMP: NORMAL
B19V IGG+IGM SER-IMP: POSITIVE
B19V IGM FLD-ACNC: 1.5 INDEX
B19V IGM SER-ACNC: POSITIVE
BACTERIA UR CULT: NORMAL

## 2019-02-27 ENCOUNTER — ASOB RESULT (OUTPATIENT)
Age: 34
End: 2019-02-27

## 2019-02-27 ENCOUNTER — APPOINTMENT (OUTPATIENT)
Dept: ANTEPARTUM | Facility: CLINIC | Age: 34
End: 2019-02-27
Payer: COMMERCIAL

## 2019-02-27 PROCEDURE — 76801 OB US < 14 WKS SINGLE FETUS: CPT

## 2019-02-27 PROCEDURE — 76813 OB US NUCHAL MEAS 1 GEST: CPT

## 2019-02-27 PROCEDURE — 36416 COLLJ CAPILLARY BLOOD SPEC: CPT

## 2019-02-27 PROCEDURE — 99244 OFF/OP CNSLTJ NEW/EST MOD 40: CPT | Mod: 25

## 2019-03-01 ENCOUNTER — APPOINTMENT (OUTPATIENT)
Dept: ANTEPARTUM | Facility: CLINIC | Age: 34
End: 2019-03-01

## 2019-03-02 ENCOUNTER — TRANSCRIPTION ENCOUNTER (OUTPATIENT)
Age: 34
End: 2019-03-02

## 2019-03-05 ENCOUNTER — ASOB RESULT (OUTPATIENT)
Age: 34
End: 2019-03-05

## 2019-03-05 ENCOUNTER — APPOINTMENT (OUTPATIENT)
Dept: ANTEPARTUM | Facility: CLINIC | Age: 34
End: 2019-03-05
Payer: COMMERCIAL

## 2019-03-05 ENCOUNTER — OTHER (OUTPATIENT)
Age: 34
End: 2019-03-05

## 2019-03-05 PROCEDURE — 99241 OFFICE CONSULTATION NEW/ESTAB PATIENT 15 MIN: CPT | Mod: 25

## 2019-03-05 PROCEDURE — 76813 OB US NUCHAL MEAS 1 GEST: CPT

## 2019-03-05 PROCEDURE — 76801 OB US < 14 WKS SINGLE FETUS: CPT

## 2019-03-05 PROCEDURE — 36416 COLLJ CAPILLARY BLOOD SPEC: CPT

## 2019-03-11 ENCOUNTER — APPOINTMENT (OUTPATIENT)
Dept: OBGYN | Facility: CLINIC | Age: 34
End: 2019-03-11
Payer: COMMERCIAL

## 2019-03-11 ENCOUNTER — NON-APPOINTMENT (OUTPATIENT)
Age: 34
End: 2019-03-11

## 2019-03-11 VITALS
RESPIRATION RATE: 14 BRPM | WEIGHT: 129 LBS | OXYGEN SATURATION: 98 % | BODY MASS INDEX: 21.49 KG/M2 | HEIGHT: 65 IN | DIASTOLIC BLOOD PRESSURE: 62 MMHG | SYSTOLIC BLOOD PRESSURE: 102 MMHG | HEART RATE: 68 BPM

## 2019-03-11 PROCEDURE — 0502F SUBSEQUENT PRENATAL CARE: CPT

## 2019-03-13 ENCOUNTER — APPOINTMENT (OUTPATIENT)
Dept: ANTEPARTUM | Facility: CLINIC | Age: 34
End: 2019-03-13
Payer: COMMERCIAL

## 2019-03-13 ENCOUNTER — ASOB RESULT (OUTPATIENT)
Age: 34
End: 2019-03-13

## 2019-03-13 PROCEDURE — 76815 OB US LIMITED FETUS(S): CPT

## 2019-03-27 ENCOUNTER — APPOINTMENT (OUTPATIENT)
Dept: OBGYN | Facility: CLINIC | Age: 34
End: 2019-03-27
Payer: COMMERCIAL

## 2019-03-27 ENCOUNTER — ASOB RESULT (OUTPATIENT)
Age: 34
End: 2019-03-27

## 2019-03-27 ENCOUNTER — APPOINTMENT (OUTPATIENT)
Dept: ANTEPARTUM | Facility: CLINIC | Age: 34
End: 2019-03-27
Payer: COMMERCIAL

## 2019-03-27 ENCOUNTER — NON-APPOINTMENT (OUTPATIENT)
Age: 34
End: 2019-03-27

## 2019-03-27 VITALS
WEIGHT: 124 LBS | DIASTOLIC BLOOD PRESSURE: 70 MMHG | HEIGHT: 65 IN | BODY MASS INDEX: 20.66 KG/M2 | SYSTOLIC BLOOD PRESSURE: 110 MMHG

## 2019-03-27 PROCEDURE — 36415 COLL VENOUS BLD VENIPUNCTURE: CPT

## 2019-03-27 PROCEDURE — 76805 OB US >/= 14 WKS SNGL FETUS: CPT

## 2019-03-27 PROCEDURE — 81003 URINALYSIS AUTO W/O SCOPE: CPT | Mod: QW

## 2019-03-27 PROCEDURE — 0502F SUBSEQUENT PRENATAL CARE: CPT

## 2019-04-05 ENCOUNTER — NON-APPOINTMENT (OUTPATIENT)
Age: 34
End: 2019-04-05

## 2019-04-05 ENCOUNTER — CLINICAL ADVICE (OUTPATIENT)
Age: 34
End: 2019-04-05

## 2019-04-09 ENCOUNTER — OTHER (OUTPATIENT)
Age: 34
End: 2019-04-09

## 2019-04-09 LAB
1ST TRIMESTER DATA: NORMAL
2ND TRIMESTER DATA: NORMAL
AFP PNL SERPL: NORMAL
AFP SERPL-ACNC: NORMAL
AFP SERPL-ACNC: NORMAL
B-HCG FREE SERPL-MCNC: NORMAL
CLINICAL BIOCHEMIST REVIEW: ABNORMAL
CLINICAL BIOCHEMIST REVIEW: NORMAL
CLINICAL BIOCHEMIST REVIEW: NORMAL
FREE BETA HCG 1ST TRIMESTER: NORMAL
INHIBIN A SERPL-MCNC: NORMAL
Lab: NORMAL
NOTES NTD: NORMAL
NT: NORMAL
PAPP-A SERPL-ACNC: NORMAL
U ESTRIOL SERPL-SCNC: NORMAL

## 2019-04-10 ENCOUNTER — APPOINTMENT (OUTPATIENT)
Dept: ANTEPARTUM | Facility: CLINIC | Age: 34
End: 2019-04-10
Payer: COMMERCIAL

## 2019-04-10 ENCOUNTER — ASOB RESULT (OUTPATIENT)
Age: 34
End: 2019-04-10

## 2019-04-10 PROCEDURE — 76815 OB US LIMITED FETUS(S): CPT

## 2019-04-19 ENCOUNTER — OUTPATIENT (OUTPATIENT)
Dept: OUTPATIENT SERVICES | Age: 34
LOS: 1 days | Discharge: HOME | End: 2019-04-19

## 2019-04-19 DIAGNOSIS — Z98.891 HISTORY OF UTERINE SCAR FROM PREVIOUS SURGERY: Chronic | ICD-10-CM

## 2019-04-22 ENCOUNTER — APPOINTMENT (OUTPATIENT)
Dept: PEDIATRIC CARDIOLOGY | Facility: CLINIC | Age: 34
End: 2019-04-22
Payer: COMMERCIAL

## 2019-04-22 PROCEDURE — 76825 ECHO EXAM OF FETAL HEART: CPT

## 2019-04-22 PROCEDURE — 76827 ECHO EXAM OF FETAL HEART: CPT

## 2019-04-22 PROCEDURE — 93325 DOPPLER ECHO COLOR FLOW MAPG: CPT

## 2019-04-24 ENCOUNTER — APPOINTMENT (OUTPATIENT)
Dept: ANTEPARTUM | Facility: CLINIC | Age: 34
End: 2019-04-24
Payer: COMMERCIAL

## 2019-04-24 ENCOUNTER — APPOINTMENT (OUTPATIENT)
Dept: OBGYN | Facility: CLINIC | Age: 34
End: 2019-04-24
Payer: COMMERCIAL

## 2019-04-24 ENCOUNTER — ASOB RESULT (OUTPATIENT)
Age: 34
End: 2019-04-24

## 2019-04-24 ENCOUNTER — NON-APPOINTMENT (OUTPATIENT)
Age: 34
End: 2019-04-24

## 2019-04-24 VITALS
BODY MASS INDEX: 21.49 KG/M2 | HEIGHT: 65 IN | DIASTOLIC BLOOD PRESSURE: 62 MMHG | WEIGHT: 129 LBS | SYSTOLIC BLOOD PRESSURE: 102 MMHG | HEART RATE: 68 BPM | RESPIRATION RATE: 14 BRPM

## 2019-04-24 PROCEDURE — 76811 OB US DETAILED SNGL FETUS: CPT

## 2019-04-24 PROCEDURE — 0502F SUBSEQUENT PRENATAL CARE: CPT

## 2019-05-07 ENCOUNTER — APPOINTMENT (OUTPATIENT)
Dept: ANTEPARTUM | Facility: CLINIC | Age: 34
End: 2019-05-07
Payer: COMMERCIAL

## 2019-05-07 ENCOUNTER — ASOB RESULT (OUTPATIENT)
Age: 34
End: 2019-05-07

## 2019-05-07 PROCEDURE — 76821 MIDDLE CEREBRAL ARTERY ECHO: CPT

## 2019-05-07 PROCEDURE — 76815 OB US LIMITED FETUS(S): CPT | Mod: 59

## 2019-05-08 ENCOUNTER — RESULT REVIEW (OUTPATIENT)
Age: 34
End: 2019-05-08

## 2019-05-22 ENCOUNTER — APPOINTMENT (OUTPATIENT)
Dept: ANTEPARTUM | Facility: CLINIC | Age: 34
End: 2019-05-22
Payer: COMMERCIAL

## 2019-05-22 ENCOUNTER — ASOB RESULT (OUTPATIENT)
Age: 34
End: 2019-05-22

## 2019-05-22 ENCOUNTER — NON-APPOINTMENT (OUTPATIENT)
Age: 34
End: 2019-05-22

## 2019-05-22 ENCOUNTER — APPOINTMENT (OUTPATIENT)
Dept: OBGYN | Facility: CLINIC | Age: 34
End: 2019-05-22
Payer: COMMERCIAL

## 2019-05-22 VITALS
SYSTOLIC BLOOD PRESSURE: 112 MMHG | HEART RATE: 80 BPM | RESPIRATION RATE: 18 BRPM | WEIGHT: 134 LBS | HEIGHT: 65 IN | OXYGEN SATURATION: 98 % | DIASTOLIC BLOOD PRESSURE: 70 MMHG | BODY MASS INDEX: 22.33 KG/M2

## 2019-05-22 PROCEDURE — 0502F SUBSEQUENT PRENATAL CARE: CPT

## 2019-05-22 PROCEDURE — 76821 MIDDLE CEREBRAL ARTERY ECHO: CPT

## 2019-05-22 PROCEDURE — 81003 URINALYSIS AUTO W/O SCOPE: CPT | Mod: QW

## 2019-05-22 PROCEDURE — 76816 OB US FOLLOW-UP PER FETUS: CPT

## 2019-05-24 LAB
BILIRUB UR QL STRIP: NORMAL
CLARITY UR: CLEAR
COLLECTION METHOD: NORMAL
GLUCOSE UR-MCNC: NORMAL
HCG UR QL: 0.2 EU/DL
HGB UR QL STRIP.AUTO: NORMAL
KETONES UR-MCNC: NORMAL
LEUKOCYTE ESTERASE UR QL STRIP: NORMAL
NITRITE UR QL STRIP: NORMAL
PH UR STRIP: 6
PROT UR STRIP-MCNC: NORMAL
SP GR UR STRIP: 1.01

## 2019-06-10 ENCOUNTER — APPOINTMENT (OUTPATIENT)
Dept: OBGYN | Facility: CLINIC | Age: 34
End: 2019-06-10
Payer: COMMERCIAL

## 2019-06-10 ENCOUNTER — NON-APPOINTMENT (OUTPATIENT)
Age: 34
End: 2019-06-10

## 2019-06-10 VITALS
DIASTOLIC BLOOD PRESSURE: 70 MMHG | WEIGHT: 138 LBS | SYSTOLIC BLOOD PRESSURE: 110 MMHG | RESPIRATION RATE: 18 BRPM | TEMPERATURE: 98.41 F | BODY MASS INDEX: 22.99 KG/M2 | HEART RATE: 80 BPM | HEIGHT: 65 IN

## 2019-06-10 LAB
BILIRUB UR QL STRIP: NORMAL
CLARITY UR: CLEAR
COLLECTION METHOD: NORMAL
GLUCOSE UR-MCNC: NORMAL
HCG UR QL: 0.2 EU/DL
HGB UR QL STRIP.AUTO: NORMAL
KETONES UR-MCNC: NORMAL
LEUKOCYTE ESTERASE UR QL STRIP: NORMAL
NITRITE UR QL STRIP: NORMAL
PH UR STRIP: 7
PROT UR STRIP-MCNC: NORMAL
SP GR UR STRIP: 1.01

## 2019-06-10 PROCEDURE — 0502F SUBSEQUENT PRENATAL CARE: CPT

## 2019-06-10 PROCEDURE — 36415 COLL VENOUS BLD VENIPUNCTURE: CPT

## 2019-06-10 PROCEDURE — 81003 URINALYSIS AUTO W/O SCOPE: CPT | Mod: QW

## 2019-06-11 LAB
BASOPHILS # BLD AUTO: 0.05 K/UL
BASOPHILS NFR BLD AUTO: 0.5 %
BLD GP AB SCN SERPL QL: NORMAL
EOSINOPHIL # BLD AUTO: 0.22 K/UL
EOSINOPHIL NFR BLD AUTO: 2.3 %
GLUCOSE 1H P 50 G GLC PO SERPL-MCNC: 85 MG/DL
HCT VFR BLD CALC: 37 %
HGB BLD-MCNC: 11.4 G/DL
IMM GRANULOCYTES NFR BLD AUTO: 0.5 %
LYMPHOCYTES # BLD AUTO: 1.52 K/UL
LYMPHOCYTES NFR BLD AUTO: 15.7 %
MAN DIFF?: NORMAL
MCHC RBC-ENTMCNC: 28.7 PG
MCHC RBC-ENTMCNC: 30.8 GM/DL
MCV RBC AUTO: 93.2 FL
MONOCYTES # BLD AUTO: 0.47 K/UL
MONOCYTES NFR BLD AUTO: 4.9 %
NEUTROPHILS # BLD AUTO: 7.35 K/UL
NEUTROPHILS NFR BLD AUTO: 76.1 %
PLATELET # BLD AUTO: 249 K/UL
RBC # BLD: 3.97 M/UL
RBC # FLD: 12.7 %
WBC # FLD AUTO: 9.66 K/UL

## 2019-06-24 ENCOUNTER — NON-APPOINTMENT (OUTPATIENT)
Age: 34
End: 2019-06-24

## 2019-06-24 ENCOUNTER — APPOINTMENT (OUTPATIENT)
Dept: OBGYN | Facility: CLINIC | Age: 34
End: 2019-06-24
Payer: COMMERCIAL

## 2019-06-24 VITALS — HEART RATE: 80 BPM | HEIGHT: 65 IN | BODY MASS INDEX: 23.49 KG/M2 | WEIGHT: 141 LBS

## 2019-06-24 LAB
BILIRUB UR QL STRIP: NORMAL
CLARITY UR: CLEAR
COLLECTION METHOD: NORMAL
GLUCOSE UR-MCNC: NORMAL
HCG UR QL: 0.2 EU/DL
HGB UR QL STRIP.AUTO: NORMAL
KETONES UR-MCNC: NORMAL
LEUKOCYTE ESTERASE UR QL STRIP: NORMAL
NITRITE UR QL STRIP: NORMAL
PH UR STRIP: 7
PROT UR STRIP-MCNC: NORMAL
SP GR UR STRIP: 1.02

## 2019-06-24 PROCEDURE — 0501F PRENATAL FLOW SHEET: CPT

## 2019-06-24 PROCEDURE — 81003 URINALYSIS AUTO W/O SCOPE: CPT | Mod: QW

## 2019-06-27 ENCOUNTER — OTHER (OUTPATIENT)
Age: 34
End: 2019-06-27

## 2019-07-08 ENCOUNTER — APPOINTMENT (OUTPATIENT)
Dept: OBGYN | Facility: CLINIC | Age: 34
End: 2019-07-08
Payer: COMMERCIAL

## 2019-07-08 ENCOUNTER — NON-APPOINTMENT (OUTPATIENT)
Age: 34
End: 2019-07-08

## 2019-07-08 VITALS
WEIGHT: 142 LBS | HEIGHT: 65 IN | TEMPERATURE: 98.9 F | SYSTOLIC BLOOD PRESSURE: 110 MMHG | DIASTOLIC BLOOD PRESSURE: 70 MMHG | BODY MASS INDEX: 23.66 KG/M2

## 2019-07-08 LAB
BILIRUB UR QL STRIP: NORMAL
CLARITY UR: CLEAR
COLLECTION METHOD: NORMAL
GLUCOSE UR-MCNC: NORMAL
HCG UR QL: 0.2 EU/DL
HGB UR QL STRIP.AUTO: NORMAL
KETONES UR-MCNC: NORMAL
LEUKOCYTE ESTERASE UR QL STRIP: NORMAL
NITRITE UR QL STRIP: NORMAL
PH UR STRIP: 6.5
PROT UR STRIP-MCNC: NORMAL
SP GR UR STRIP: 1.02

## 2019-07-08 PROCEDURE — 0502F SUBSEQUENT PRENATAL CARE: CPT

## 2019-07-08 PROCEDURE — 81003 URINALYSIS AUTO W/O SCOPE: CPT | Mod: QW

## 2019-07-17 ENCOUNTER — ASOB RESULT (OUTPATIENT)
Age: 34
End: 2019-07-17

## 2019-07-17 ENCOUNTER — APPOINTMENT (OUTPATIENT)
Dept: ANTEPARTUM | Facility: CLINIC | Age: 34
End: 2019-07-17
Payer: COMMERCIAL

## 2019-07-17 PROCEDURE — 76816 OB US FOLLOW-UP PER FETUS: CPT

## 2019-07-17 PROCEDURE — 76821 MIDDLE CEREBRAL ARTERY ECHO: CPT

## 2019-07-22 ENCOUNTER — NON-APPOINTMENT (OUTPATIENT)
Age: 34
End: 2019-07-22

## 2019-07-22 ENCOUNTER — APPOINTMENT (OUTPATIENT)
Dept: OBGYN | Facility: CLINIC | Age: 34
End: 2019-07-22
Payer: COMMERCIAL

## 2019-07-22 VITALS
TEMPERATURE: 98.6 F | DIASTOLIC BLOOD PRESSURE: 70 MMHG | WEIGHT: 145 LBS | BODY MASS INDEX: 24.16 KG/M2 | HEIGHT: 65 IN | SYSTOLIC BLOOD PRESSURE: 110 MMHG

## 2019-07-22 PROCEDURE — 0502F SUBSEQUENT PRENATAL CARE: CPT

## 2019-07-22 PROCEDURE — 81003 URINALYSIS AUTO W/O SCOPE: CPT | Mod: QW

## 2019-07-22 NOTE — H&P ADULT - PROBLEM/PLAN-3
DISPLAY PLAN FREE TEXT negative Alert & oriented; no sensory, motor or coordination deficits, normal reflexes

## 2019-08-05 ENCOUNTER — NON-APPOINTMENT (OUTPATIENT)
Age: 34
End: 2019-08-05

## 2019-08-05 ENCOUNTER — APPOINTMENT (OUTPATIENT)
Dept: OBGYN | Facility: CLINIC | Age: 34
End: 2019-08-05
Payer: COMMERCIAL

## 2019-08-05 ENCOUNTER — APPOINTMENT (OUTPATIENT)
Dept: OBGYN | Facility: CLINIC | Age: 34
End: 2019-08-05

## 2019-08-05 VITALS
WEIGHT: 145 LBS | SYSTOLIC BLOOD PRESSURE: 112 MMHG | HEART RATE: 72 BPM | DIASTOLIC BLOOD PRESSURE: 70 MMHG | HEIGHT: 65 IN | BODY MASS INDEX: 24.16 KG/M2 | RESPIRATION RATE: 16 BRPM

## 2019-08-05 PROCEDURE — 0502F SUBSEQUENT PRENATAL CARE: CPT

## 2019-08-14 ENCOUNTER — APPOINTMENT (OUTPATIENT)
Dept: OBGYN | Facility: CLINIC | Age: 34
End: 2019-08-14
Payer: COMMERCIAL

## 2019-08-14 ENCOUNTER — NON-APPOINTMENT (OUTPATIENT)
Age: 34
End: 2019-08-14

## 2019-08-14 VITALS — SYSTOLIC BLOOD PRESSURE: 110 MMHG | DIASTOLIC BLOOD PRESSURE: 76 MMHG

## 2019-08-14 VITALS
DIASTOLIC BLOOD PRESSURE: 86 MMHG | BODY MASS INDEX: 24.32 KG/M2 | RESPIRATION RATE: 18 BRPM | HEIGHT: 65 IN | SYSTOLIC BLOOD PRESSURE: 118 MMHG | WEIGHT: 146 LBS

## 2019-08-14 LAB
BILIRUB UR QL STRIP: NORMAL
CLARITY UR: CLEAR
COLLECTION METHOD: NORMAL
GLUCOSE UR-MCNC: NORMAL
HCG UR QL: 0.2 EU/DL
HGB UR QL STRIP.AUTO: NORMAL
KETONES UR-MCNC: NORMAL
LEUKOCYTE ESTERASE UR QL STRIP: NORMAL
NITRITE UR QL STRIP: NORMAL
PH UR STRIP: 6
PROT UR STRIP-MCNC: NORMAL
SP GR UR STRIP: 1.02

## 2019-08-14 PROCEDURE — 81003 URINALYSIS AUTO W/O SCOPE: CPT | Mod: QW

## 2019-08-14 PROCEDURE — 0502F SUBSEQUENT PRENATAL CARE: CPT

## 2019-08-14 PROCEDURE — 59025 FETAL NON-STRESS TEST: CPT

## 2019-08-15 ENCOUNTER — CLINICAL ADVICE (OUTPATIENT)
Age: 34
End: 2019-08-15

## 2019-08-15 LAB
ALBUMIN SERPL ELPH-MCNC: 3.7 G/DL
ALP BLD-CCNC: 92 U/L
ALT SERPL-CCNC: 7 U/L
ANION GAP SERPL CALC-SCNC: 16 MMOL/L
AST SERPL-CCNC: 17 U/L
BASOPHILS # BLD AUTO: 0.06 K/UL
BASOPHILS NFR BLD AUTO: 0.6 %
BILIRUB SERPL-MCNC: 0.3 MG/DL
BUN SERPL-MCNC: 7 MG/DL
CALCIUM SERPL-MCNC: 8.6 MG/DL
CHLORIDE SERPL-SCNC: 102 MMOL/L
CO2 SERPL-SCNC: 21 MMOL/L
CREAT SERPL-MCNC: 0.71 MG/DL
EOSINOPHIL # BLD AUTO: 0.35 K/UL
EOSINOPHIL NFR BLD AUTO: 3.6 %
GLUCOSE SERPL-MCNC: 24 MG/DL
HCT VFR BLD CALC: 34.8 %
HGB BLD-MCNC: 10.4 G/DL
HIV1+2 AB SPEC QL IA.RAPID: NONREACTIVE
IMM GRANULOCYTES NFR BLD AUTO: 0.4 %
LYMPHOCYTES # BLD AUTO: 1.73 K/UL
LYMPHOCYTES NFR BLD AUTO: 17.6 %
MAN DIFF?: NORMAL
MCHC RBC-ENTMCNC: 25.3 PG
MCHC RBC-ENTMCNC: 29.9 GM/DL
MCV RBC AUTO: 84.7 FL
MONOCYTES # BLD AUTO: 0.59 K/UL
MONOCYTES NFR BLD AUTO: 6 %
NEUTROPHILS # BLD AUTO: 7.06 K/UL
NEUTROPHILS NFR BLD AUTO: 71.8 %
PLATELET # BLD AUTO: 238 K/UL
POTASSIUM SERPL-SCNC: 4.3 MMOL/L
PROT SERPL-MCNC: 6.4 G/DL
RBC # BLD: 4.11 M/UL
RBC # FLD: 13.8 %
SODIUM SERPL-SCNC: 139 MMOL/L
WBC # FLD AUTO: 9.83 K/UL

## 2019-08-20 ENCOUNTER — RESULT REVIEW (OUTPATIENT)
Age: 34
End: 2019-08-20

## 2019-08-21 ENCOUNTER — APPOINTMENT (OUTPATIENT)
Dept: OBGYN | Facility: CLINIC | Age: 34
End: 2019-08-21
Payer: COMMERCIAL

## 2019-08-21 ENCOUNTER — NON-APPOINTMENT (OUTPATIENT)
Age: 34
End: 2019-08-21

## 2019-08-21 VITALS
SYSTOLIC BLOOD PRESSURE: 112 MMHG | HEIGHT: 65 IN | WEIGHT: 147 LBS | BODY MASS INDEX: 24.49 KG/M2 | TEMPERATURE: 98.7 F | DIASTOLIC BLOOD PRESSURE: 74 MMHG

## 2019-08-21 LAB
BILE AC SER-MCNC: 5 UMOL/L
BILIRUB UR QL STRIP: NORMAL
CLARITY UR: CLEAR
COLLECTION METHOD: NORMAL
GLUCOSE UR-MCNC: NORMAL
GP B STREP DNA SPEC QL NAA+PROBE: NORMAL
GP B STREP DNA SPEC QL NAA+PROBE: NOT DETECTED
HCG UR QL: 0.2 EU/DL
HGB UR QL STRIP.AUTO: NORMAL
KETONES UR-MCNC: NORMAL
LEUKOCYTE ESTERASE UR QL STRIP: NORMAL
NITRITE UR QL STRIP: NORMAL
PH UR STRIP: 7
PROT UR STRIP-MCNC: NORMAL
SOURCE GBS: NORMAL
SP GR UR STRIP: 1.02

## 2019-08-21 PROCEDURE — 0502F SUBSEQUENT PRENATAL CARE: CPT

## 2019-08-21 PROCEDURE — 81003 URINALYSIS AUTO W/O SCOPE: CPT | Mod: QW

## 2019-08-21 PROCEDURE — 59025 FETAL NON-STRESS TEST: CPT

## 2019-08-28 ENCOUNTER — APPOINTMENT (OUTPATIENT)
Dept: OBGYN | Facility: CLINIC | Age: 34
End: 2019-08-28
Payer: COMMERCIAL

## 2019-08-28 ENCOUNTER — NON-APPOINTMENT (OUTPATIENT)
Age: 34
End: 2019-08-28

## 2019-08-28 VITALS
RESPIRATION RATE: 18 BRPM | SYSTOLIC BLOOD PRESSURE: 118 MMHG | WEIGHT: 151 LBS | DIASTOLIC BLOOD PRESSURE: 84 MMHG | BODY MASS INDEX: 25.16 KG/M2 | HEIGHT: 65 IN

## 2019-08-28 PROCEDURE — 81003 URINALYSIS AUTO W/O SCOPE: CPT | Mod: QW

## 2019-08-28 PROCEDURE — 0502F SUBSEQUENT PRENATAL CARE: CPT

## 2019-09-04 ENCOUNTER — APPOINTMENT (OUTPATIENT)
Dept: OBGYN | Facility: CLINIC | Age: 34
End: 2019-09-04
Payer: COMMERCIAL

## 2019-09-04 ENCOUNTER — OUTPATIENT (OUTPATIENT)
Dept: OUTPATIENT SERVICES | Facility: HOSPITAL | Age: 34
LOS: 1 days | End: 2019-09-04
Payer: COMMERCIAL

## 2019-09-04 ENCOUNTER — NON-APPOINTMENT (OUTPATIENT)
Age: 34
End: 2019-09-04

## 2019-09-04 VITALS
SYSTOLIC BLOOD PRESSURE: 110 MMHG | BODY MASS INDEX: 24.99 KG/M2 | WEIGHT: 150 LBS | HEIGHT: 65 IN | DIASTOLIC BLOOD PRESSURE: 70 MMHG | TEMPERATURE: 98.8 F

## 2019-09-04 DIAGNOSIS — Z98.51 TUBAL LIGATION STATUS: ICD-10-CM

## 2019-09-04 DIAGNOSIS — Z98.891 HISTORY OF UTERINE SCAR FROM PREVIOUS SURGERY: Chronic | ICD-10-CM

## 2019-09-04 DIAGNOSIS — Z01.818 ENCOUNTER FOR OTHER PREPROCEDURAL EXAMINATION: ICD-10-CM

## 2019-09-04 DIAGNOSIS — Z98.891 HISTORY OF UTERINE SCAR FROM PREVIOUS SURGERY: ICD-10-CM

## 2019-09-04 LAB
BASOPHILS # BLD AUTO: 0.05 K/UL — SIGNIFICANT CHANGE UP (ref 0–0.2)
BASOPHILS NFR BLD AUTO: 0.6 % — SIGNIFICANT CHANGE UP (ref 0–2)
BILIRUB UR QL STRIP: NORMAL
CLARITY UR: CLEAR
COLLECTION METHOD: NORMAL
EOSINOPHIL # BLD AUTO: 0.14 K/UL — SIGNIFICANT CHANGE UP (ref 0–0.5)
EOSINOPHIL NFR BLD AUTO: 1.7 % — SIGNIFICANT CHANGE UP (ref 0–6)
GLUCOSE UR-MCNC: NORMAL
HCG UR QL: 0.2 EU/DL
HCT VFR BLD CALC: 32.5 % — LOW (ref 34.5–45)
HGB BLD-MCNC: 10 G/DL — LOW (ref 11.5–15.5)
HGB UR QL STRIP.AUTO: NORMAL
IMM GRANULOCYTES NFR BLD AUTO: 0.5 % — SIGNIFICANT CHANGE UP (ref 0–1.5)
KETONES UR-MCNC: NORMAL
LEUKOCYTE ESTERASE UR QL STRIP: NORMAL
LYMPHOCYTES # BLD AUTO: 1.5 K/UL — SIGNIFICANT CHANGE UP (ref 1–3.3)
LYMPHOCYTES # BLD AUTO: 18.6 % — SIGNIFICANT CHANGE UP (ref 13–44)
MCHC RBC-ENTMCNC: 24.1 PG — LOW (ref 27–34)
MCHC RBC-ENTMCNC: 30.8 GM/DL — LOW (ref 32–36)
MCV RBC AUTO: 78.3 FL — LOW (ref 80–100)
MONOCYTES # BLD AUTO: 0.42 K/UL — SIGNIFICANT CHANGE UP (ref 0–0.9)
MONOCYTES NFR BLD AUTO: 5.2 % — SIGNIFICANT CHANGE UP (ref 2–14)
NEUTROPHILS # BLD AUTO: 5.9 K/UL — SIGNIFICANT CHANGE UP (ref 1.8–7.4)
NEUTROPHILS NFR BLD AUTO: 73.4 % — SIGNIFICANT CHANGE UP (ref 43–77)
NITRITE UR QL STRIP: NORMAL
PH UR STRIP: 6.5
PLATELET # BLD AUTO: 245 K/UL — SIGNIFICANT CHANGE UP (ref 150–400)
PROT UR STRIP-MCNC: NORMAL
RBC # BLD: 4.15 M/UL — SIGNIFICANT CHANGE UP (ref 3.8–5.2)
RBC # FLD: 14.8 % — HIGH (ref 10.3–14.5)
SP GR UR STRIP: 1.01
WBC # BLD: 8.05 K/UL — SIGNIFICANT CHANGE UP (ref 3.8–10.5)
WBC # FLD AUTO: 8.05 K/UL — SIGNIFICANT CHANGE UP (ref 3.8–10.5)

## 2019-09-04 PROCEDURE — 36415 COLL VENOUS BLD VENIPUNCTURE: CPT

## 2019-09-04 PROCEDURE — 0502F SUBSEQUENT PRENATAL CARE: CPT | Mod: 25

## 2019-09-04 PROCEDURE — 86900 BLOOD TYPING SEROLOGIC ABO: CPT

## 2019-09-04 PROCEDURE — 81003 URINALYSIS AUTO W/O SCOPE: CPT | Mod: QW

## 2019-09-04 PROCEDURE — 86901 BLOOD TYPING SEROLOGIC RH(D): CPT

## 2019-09-04 PROCEDURE — 86850 RBC ANTIBODY SCREEN: CPT

## 2019-09-04 PROCEDURE — 59025 FETAL NON-STRESS TEST: CPT

## 2019-09-04 PROCEDURE — 85025 COMPLETE CBC W/AUTO DIFF WBC: CPT

## 2019-09-05 ENCOUNTER — INPATIENT (INPATIENT)
Facility: HOSPITAL | Age: 34
LOS: 2 days | Discharge: HOME | End: 2019-09-08
Attending: OBSTETRICS & GYNECOLOGY | Admitting: OBSTETRICS & GYNECOLOGY
Payer: COMMERCIAL

## 2019-09-05 ENCOUNTER — RESULT REVIEW (OUTPATIENT)
Age: 34
End: 2019-09-05

## 2019-09-05 ENCOUNTER — APPOINTMENT (OUTPATIENT)
Dept: OBGYN | Facility: HOSPITAL | Age: 34
End: 2019-09-05

## 2019-09-05 VITALS — WEIGHT: 149.91 LBS | HEIGHT: 65 IN

## 2019-09-05 DIAGNOSIS — Z98.891 HISTORY OF UTERINE SCAR FROM PREVIOUS SURGERY: ICD-10-CM

## 2019-09-05 DIAGNOSIS — Z98.51 TUBAL LIGATION STATUS: ICD-10-CM

## 2019-09-05 DIAGNOSIS — Z01.818 ENCOUNTER FOR OTHER PREPROCEDURAL EXAMINATION: ICD-10-CM

## 2019-09-05 DIAGNOSIS — Z98.891 HISTORY OF UTERINE SCAR FROM PREVIOUS SURGERY: Chronic | ICD-10-CM

## 2019-09-05 LAB — T PALLIDUM AB TITR SER: NEGATIVE — SIGNIFICANT CHANGE UP

## 2019-09-05 PROCEDURE — 94760 N-INVAS EAR/PLS OXIMETRY 1: CPT

## 2019-09-05 PROCEDURE — 85025 COMPLETE CBC W/AUTO DIFF WBC: CPT

## 2019-09-05 PROCEDURE — 58611 LIGATE OVIDUCT(S) ADD-ON: CPT

## 2019-09-05 PROCEDURE — 59514 CESAREAN DELIVERY ONLY: CPT | Mod: 80

## 2019-09-05 PROCEDURE — 88302 TISSUE EXAM BY PATHOLOGIST: CPT

## 2019-09-05 PROCEDURE — 88307 TISSUE EXAM BY PATHOLOGIST: CPT

## 2019-09-05 PROCEDURE — 59510 CESAREAN DELIVERY: CPT

## 2019-09-05 PROCEDURE — 88302 TISSUE EXAM BY PATHOLOGIST: CPT | Mod: 26

## 2019-09-05 PROCEDURE — C1765: CPT

## 2019-09-05 PROCEDURE — 36415 COLL VENOUS BLD VENIPUNCTURE: CPT

## 2019-09-05 PROCEDURE — 86780 TREPONEMA PALLIDUM: CPT

## 2019-09-05 PROCEDURE — 59050 FETAL MONITOR W/REPORT: CPT

## 2019-09-05 PROCEDURE — 88307 TISSUE EXAM BY PATHOLOGIST: CPT | Mod: 26

## 2019-09-05 RX ORDER — OXYCODONE HYDROCHLORIDE 5 MG/1
5 TABLET ORAL ONCE
Refills: 0 | Status: DISCONTINUED | OUTPATIENT
Start: 2019-09-05 | End: 2019-09-08

## 2019-09-05 RX ORDER — HYDROMORPHONE HYDROCHLORIDE 2 MG/ML
1 INJECTION INTRAMUSCULAR; INTRAVENOUS; SUBCUTANEOUS
Refills: 0 | Status: DISCONTINUED | OUTPATIENT
Start: 2019-09-05 | End: 2019-09-08

## 2019-09-05 RX ORDER — GLYCERIN ADULT
1 SUPPOSITORY, RECTAL RECTAL AT BEDTIME
Refills: 0 | Status: DISCONTINUED | OUTPATIENT
Start: 2019-09-05 | End: 2019-09-08

## 2019-09-05 RX ORDER — SODIUM CHLORIDE 9 MG/ML
1000 INJECTION, SOLUTION INTRAVENOUS
Refills: 0 | Status: DISCONTINUED | OUTPATIENT
Start: 2019-09-05 | End: 2019-09-08

## 2019-09-05 RX ORDER — ACETAMINOPHEN 500 MG
1000 TABLET ORAL ONCE
Refills: 0 | Status: COMPLETED | OUTPATIENT
Start: 2019-09-05 | End: 2019-09-05

## 2019-09-05 RX ORDER — ACETAMINOPHEN 500 MG
975 TABLET ORAL
Refills: 0 | Status: DISCONTINUED | OUTPATIENT
Start: 2019-09-05 | End: 2019-09-08

## 2019-09-05 RX ORDER — SODIUM CHLORIDE 9 MG/ML
1000 INJECTION, SOLUTION INTRAVENOUS ONCE
Refills: 0 | Status: COMPLETED | OUTPATIENT
Start: 2019-09-05 | End: 2019-09-05

## 2019-09-05 RX ORDER — OXYTOCIN 10 UNIT/ML
333.33 VIAL (ML) INJECTION
Qty: 20 | Refills: 0 | Status: DISCONTINUED | OUTPATIENT
Start: 2019-09-05 | End: 2019-09-08

## 2019-09-05 RX ORDER — IBUPROFEN 200 MG
600 TABLET ORAL EVERY 6 HOURS
Refills: 0 | Status: COMPLETED | OUTPATIENT
Start: 2019-09-05 | End: 2020-08-03

## 2019-09-05 RX ORDER — SODIUM CHLORIDE 9 MG/ML
1000 INJECTION, SOLUTION INTRAVENOUS
Refills: 0 | Status: DISCONTINUED | OUTPATIENT
Start: 2019-09-05 | End: 2019-09-05

## 2019-09-05 RX ORDER — ONDANSETRON 8 MG/1
4 TABLET, FILM COATED ORAL EVERY 6 HOURS
Refills: 0 | Status: DISCONTINUED | OUTPATIENT
Start: 2019-09-05 | End: 2019-09-08

## 2019-09-05 RX ORDER — MORPHINE SULFATE 50 MG/1
0.2 CAPSULE, EXTENDED RELEASE ORAL ONCE
Refills: 0 | Status: DISCONTINUED | OUTPATIENT
Start: 2019-09-05 | End: 2019-09-08

## 2019-09-05 RX ORDER — SIMETHICONE 80 MG/1
80 TABLET, CHEWABLE ORAL EVERY 4 HOURS
Refills: 0 | Status: DISCONTINUED | OUTPATIENT
Start: 2019-09-05 | End: 2019-09-08

## 2019-09-05 RX ORDER — DIPHENHYDRAMINE HCL 50 MG
25 CAPSULE ORAL EVERY 6 HOURS
Refills: 0 | Status: DISCONTINUED | OUTPATIENT
Start: 2019-09-05 | End: 2019-09-08

## 2019-09-05 RX ORDER — FAMOTIDINE 10 MG/ML
20 INJECTION INTRAVENOUS ONCE
Refills: 0 | Status: COMPLETED | OUTPATIENT
Start: 2019-09-05 | End: 2019-09-05

## 2019-09-05 RX ORDER — OXYCODONE HYDROCHLORIDE 5 MG/1
5 TABLET ORAL
Refills: 0 | Status: DISCONTINUED | OUTPATIENT
Start: 2019-09-05 | End: 2019-09-08

## 2019-09-05 RX ORDER — TETANUS TOXOID, REDUCED DIPHTHERIA TOXOID AND ACELLULAR PERTUSSIS VACCINE, ADSORBED 5; 2.5; 8; 8; 2.5 [IU]/.5ML; [IU]/.5ML; UG/.5ML; UG/.5ML; UG/.5ML
0.5 SUSPENSION INTRAMUSCULAR ONCE
Refills: 0 | Status: DISCONTINUED | OUTPATIENT
Start: 2019-09-05 | End: 2019-09-08

## 2019-09-05 RX ORDER — OXYCODONE HYDROCHLORIDE 5 MG/1
10 TABLET ORAL
Refills: 0 | Status: DISCONTINUED | OUTPATIENT
Start: 2019-09-05 | End: 2019-09-08

## 2019-09-05 RX ORDER — LANOLIN
1 OINTMENT (GRAM) TOPICAL EVERY 6 HOURS
Refills: 0 | Status: DISCONTINUED | OUTPATIENT
Start: 2019-09-05 | End: 2019-09-08

## 2019-09-05 RX ORDER — NALOXONE HYDROCHLORIDE 4 MG/.1ML
0.1 SPRAY NASAL
Refills: 0 | Status: DISCONTINUED | OUTPATIENT
Start: 2019-09-05 | End: 2019-09-08

## 2019-09-05 RX ORDER — CEFAZOLIN SODIUM 1 G
2000 VIAL (EA) INJECTION ONCE
Refills: 0 | Status: COMPLETED | OUTPATIENT
Start: 2019-09-05 | End: 2019-09-05

## 2019-09-05 RX ORDER — METOCLOPRAMIDE HCL 10 MG
10 TABLET ORAL ONCE
Refills: 0 | Status: DISCONTINUED | OUTPATIENT
Start: 2019-09-05 | End: 2019-09-05

## 2019-09-05 RX ORDER — DOCUSATE SODIUM 100 MG
100 CAPSULE ORAL
Refills: 0 | Status: DISCONTINUED | OUTPATIENT
Start: 2019-09-05 | End: 2019-09-08

## 2019-09-05 RX ORDER — IBUPROFEN 200 MG
600 TABLET ORAL EVERY 6 HOURS
Refills: 0 | Status: DISCONTINUED | OUTPATIENT
Start: 2019-09-05 | End: 2019-09-08

## 2019-09-05 RX ORDER — CITRIC ACID/SODIUM CITRATE 300-500 MG
30 SOLUTION, ORAL ORAL ONCE
Refills: 0 | Status: COMPLETED | OUTPATIENT
Start: 2019-09-05 | End: 2019-09-05

## 2019-09-05 RX ORDER — MAGNESIUM HYDROXIDE 400 MG/1
30 TABLET, CHEWABLE ORAL
Refills: 0 | Status: DISCONTINUED | OUTPATIENT
Start: 2019-09-05 | End: 2019-09-08

## 2019-09-05 RX ADMIN — Medication 600 MILLIGRAM(S): at 18:54

## 2019-09-05 RX ADMIN — Medication 30 MILLILITER(S): at 12:00

## 2019-09-05 RX ADMIN — Medication 1000 MILLIUNIT(S)/MIN: at 13:38

## 2019-09-05 RX ADMIN — Medication 600 MILLIGRAM(S): at 18:53

## 2019-09-05 RX ADMIN — OXYCODONE HYDROCHLORIDE 5 MILLIGRAM(S): 5 TABLET ORAL at 21:53

## 2019-09-05 RX ADMIN — Medication 100 MILLIGRAM(S): at 12:26

## 2019-09-05 RX ADMIN — OXYCODONE HYDROCHLORIDE 5 MILLIGRAM(S): 5 TABLET ORAL at 20:53

## 2019-09-05 RX ADMIN — Medication 975 MILLIGRAM(S): at 20:53

## 2019-09-05 RX ADMIN — Medication 400 MILLIGRAM(S): at 13:45

## 2019-09-05 RX ADMIN — SODIUM CHLORIDE 2000 MILLILITER(S): 9 INJECTION, SOLUTION INTRAVENOUS at 10:51

## 2019-09-05 RX ADMIN — FAMOTIDINE 20 MILLIGRAM(S): 10 INJECTION INTRAVENOUS at 12:00

## 2019-09-05 RX ADMIN — OXYCODONE HYDROCHLORIDE 5 MILLIGRAM(S): 5 TABLET ORAL at 15:59

## 2019-09-05 RX ADMIN — Medication 100 MILLIGRAM(S): at 20:52

## 2019-09-05 RX ADMIN — Medication 975 MILLIGRAM(S): at 21:53

## 2019-09-05 NOTE — CHART NOTE - NSCHARTNOTEFT_GEN_A_CORE
As per Dr. Bacon, he does not want chemical DVT prophylaxis due to history of hematoma after  section.

## 2019-09-05 NOTE — PATIENT PROFILE OB - ALERT: PERTINENT HISTORY
Patient desires tubal ligation/Follow up Sonogram for Growth/1st Trimester Sonogram/20 Week Level II Sonogram/Ultra Screen at 12 Weeks

## 2019-09-05 NOTE — PATIENT PROFILE OB - TEACHING/LEARNING LEARNING PREFERENCES
skill demonstration/verbal instruction/written material I have personally seen and examined this patient.  I have fully participated in the care of this patient. I have reviewed all pertinent clinical information, including history, physical exam, plan and the Resident’s note and agree except as noted.

## 2019-09-06 LAB
BASOPHILS # BLD AUTO: 0.04 K/UL — SIGNIFICANT CHANGE UP (ref 0–0.2)
BASOPHILS NFR BLD AUTO: 0.4 % — SIGNIFICANT CHANGE UP (ref 0–2)
EOSINOPHIL # BLD AUTO: 0.28 K/UL — SIGNIFICANT CHANGE UP (ref 0–0.5)
EOSINOPHIL NFR BLD AUTO: 3 % — SIGNIFICANT CHANGE UP (ref 0–6)
HCT VFR BLD CALC: 30.8 % — LOW (ref 34.5–45)
HGB BLD-MCNC: 9.3 G/DL — LOW (ref 11.5–15.5)
IMM GRANULOCYTES NFR BLD AUTO: 0.5 % — SIGNIFICANT CHANGE UP (ref 0–1.5)
LYMPHOCYTES # BLD AUTO: 1.23 K/UL — SIGNIFICANT CHANGE UP (ref 1–3.3)
LYMPHOCYTES # BLD AUTO: 13.3 % — SIGNIFICANT CHANGE UP (ref 13–44)
MCHC RBC-ENTMCNC: 23.8 PG — LOW (ref 27–34)
MCHC RBC-ENTMCNC: 30.2 GM/DL — LOW (ref 32–36)
MCV RBC AUTO: 79 FL — LOW (ref 80–100)
MONOCYTES # BLD AUTO: 0.54 K/UL — SIGNIFICANT CHANGE UP (ref 0–0.9)
MONOCYTES NFR BLD AUTO: 5.8 % — SIGNIFICANT CHANGE UP (ref 2–14)
NEUTROPHILS # BLD AUTO: 7.14 K/UL — SIGNIFICANT CHANGE UP (ref 1.8–7.4)
NEUTROPHILS NFR BLD AUTO: 77 % — SIGNIFICANT CHANGE UP (ref 43–77)
PLATELET # BLD AUTO: 218 K/UL — SIGNIFICANT CHANGE UP (ref 150–400)
RBC # BLD: 3.9 M/UL — SIGNIFICANT CHANGE UP (ref 3.8–5.2)
RBC # FLD: 14.9 % — HIGH (ref 10.3–14.5)
WBC # BLD: 9.28 K/UL — SIGNIFICANT CHANGE UP (ref 3.8–10.5)
WBC # FLD AUTO: 9.28 K/UL — SIGNIFICANT CHANGE UP (ref 3.8–10.5)

## 2019-09-06 RX ADMIN — Medication 600 MILLIGRAM(S): at 17:46

## 2019-09-06 RX ADMIN — Medication 975 MILLIGRAM(S): at 10:34

## 2019-09-06 RX ADMIN — Medication 975 MILLIGRAM(S): at 22:00

## 2019-09-06 RX ADMIN — MAGNESIUM HYDROXIDE 30 MILLILITER(S): 400 TABLET, CHEWABLE ORAL at 14:51

## 2019-09-06 RX ADMIN — Medication 975 MILLIGRAM(S): at 04:45

## 2019-09-06 RX ADMIN — Medication 975 MILLIGRAM(S): at 21:36

## 2019-09-06 RX ADMIN — Medication 600 MILLIGRAM(S): at 00:18

## 2019-09-06 RX ADMIN — Medication 975 MILLIGRAM(S): at 09:34

## 2019-09-06 RX ADMIN — SIMETHICONE 80 MILLIGRAM(S): 80 TABLET, CHEWABLE ORAL at 09:33

## 2019-09-06 RX ADMIN — Medication 600 MILLIGRAM(S): at 13:25

## 2019-09-06 RX ADMIN — Medication 600 MILLIGRAM(S): at 06:27

## 2019-09-06 RX ADMIN — Medication 600 MILLIGRAM(S): at 18:43

## 2019-09-06 RX ADMIN — Medication 975 MILLIGRAM(S): at 03:16

## 2019-09-06 RX ADMIN — Medication 600 MILLIGRAM(S): at 12:25

## 2019-09-06 RX ADMIN — Medication 975 MILLIGRAM(S): at 15:35

## 2019-09-06 RX ADMIN — Medication 600 MILLIGRAM(S): at 07:15

## 2019-09-06 RX ADMIN — Medication 100 MILLIGRAM(S): at 09:33

## 2019-09-06 RX ADMIN — Medication 975 MILLIGRAM(S): at 14:52

## 2019-09-06 NOTE — PROGRESS NOTE ADULT - PROBLEM SELECTOR PLAN 1
P: Continue breast feeing and ambulation to encourage gas movement  Continue pain medication regimen to control pain  To follow up with Dr. Bacon out patient    Note written by Felicity Parker NP student

## 2019-09-06 NOTE — PROGRESS NOTE ADULT - SUBJECTIVE AND OBJECTIVE BOX
S: Patient breastfeeding with no difficulties and denies nipple pain, bleeding or cracking  Denies passing flatus, complaints of gas pain referred to chest however tolerating diet  Moderate abdominal tenderness around incisional site well controlled with pain medication regimen, patient states pain is much more controlled compared to previous   Urinating with no difficulty  Complaints of moderate lochia  Denies swelling, tenderness, or redness in all extremities    O:                        9.3    9.28  )-----------( 218      ( 06 Sep 2019 07:32 )             30.8     Vital Signs Last 24 Hrs  T(C): 36.7 (06 Sep 2019 07:40), Max: 36.8 (05 Sep 2019 19:33)  T(F): 98 (06 Sep 2019 07:40), Max: 98.2 (05 Sep 2019 19:33)  HR: 72 (06 Sep 2019 07:40) (66 - 80)  BP: 102/63 (06 Sep 2019 07:40) (92/52 - 112/74)  BP(mean): --  RR: 18 (06 Sep 2019 07:40) (13 - 18)  SpO2: 100% (06 Sep 2019 07:40) (98% - 100%)    Lungs clear to auscultation  S1 and S2 noted  Abdomen soft, slightly tender around incision, non distended, low transverse midline incision covered by dressing, present bowel sounds  Moderate Lochia present  No tenderness, redness, or swelling in all extremities

## 2019-09-07 RX ADMIN — OXYCODONE HYDROCHLORIDE 5 MILLIGRAM(S): 5 TABLET ORAL at 07:15

## 2019-09-07 RX ADMIN — Medication 600 MILLIGRAM(S): at 18:43

## 2019-09-07 RX ADMIN — OXYCODONE HYDROCHLORIDE 5 MILLIGRAM(S): 5 TABLET ORAL at 16:11

## 2019-09-07 RX ADMIN — Medication 600 MILLIGRAM(S): at 01:30

## 2019-09-07 RX ADMIN — Medication 100 MILLIGRAM(S): at 06:02

## 2019-09-07 RX ADMIN — Medication 100 MILLIGRAM(S): at 15:55

## 2019-09-07 RX ADMIN — MAGNESIUM HYDROXIDE 30 MILLILITER(S): 400 TABLET, CHEWABLE ORAL at 09:31

## 2019-09-07 RX ADMIN — OXYCODONE HYDROCHLORIDE 5 MILLIGRAM(S): 5 TABLET ORAL at 10:48

## 2019-09-07 RX ADMIN — Medication 600 MILLIGRAM(S): at 12:49

## 2019-09-07 RX ADMIN — Medication 975 MILLIGRAM(S): at 15:55

## 2019-09-07 RX ADMIN — OXYCODONE HYDROCHLORIDE 5 MILLIGRAM(S): 5 TABLET ORAL at 01:50

## 2019-09-07 RX ADMIN — Medication 600 MILLIGRAM(S): at 07:14

## 2019-09-07 RX ADMIN — Medication 600 MILLIGRAM(S): at 18:44

## 2019-09-07 RX ADMIN — Medication 600 MILLIGRAM(S): at 06:02

## 2019-09-07 RX ADMIN — SIMETHICONE 80 MILLIGRAM(S): 80 TABLET, CHEWABLE ORAL at 06:02

## 2019-09-07 RX ADMIN — Medication 10 MILLIGRAM(S): at 18:44

## 2019-09-07 RX ADMIN — Medication 975 MILLIGRAM(S): at 09:31

## 2019-09-07 RX ADMIN — SIMETHICONE 80 MILLIGRAM(S): 80 TABLET, CHEWABLE ORAL at 18:43

## 2019-09-07 RX ADMIN — Medication 975 MILLIGRAM(S): at 15:56

## 2019-09-07 RX ADMIN — Medication 600 MILLIGRAM(S): at 12:48

## 2019-09-07 RX ADMIN — SIMETHICONE 80 MILLIGRAM(S): 80 TABLET, CHEWABLE ORAL at 12:48

## 2019-09-07 RX ADMIN — OXYCODONE HYDROCHLORIDE 5 MILLIGRAM(S): 5 TABLET ORAL at 02:50

## 2019-09-07 RX ADMIN — SIMETHICONE 80 MILLIGRAM(S): 80 TABLET, CHEWABLE ORAL at 00:31

## 2019-09-07 RX ADMIN — OXYCODONE HYDROCHLORIDE 5 MILLIGRAM(S): 5 TABLET ORAL at 06:02

## 2019-09-07 RX ADMIN — Medication 600 MILLIGRAM(S): at 00:31

## 2019-09-07 RX ADMIN — Medication 975 MILLIGRAM(S): at 21:38

## 2019-09-07 NOTE — PROGRESS NOTE ADULT - SUBJECTIVE AND OBJECTIVE BOX
She is a  34y G who is now post-operative day 2 from C section.     Subjective:  The patient is complaining of ongoing gas, but is passing a small amount of flatus. She states it's worse when lying down and nursing.   She is ambulating and tolerating a diet. She states she ate chicken fajita last night.   She is having  + flatus and is yet to have a BM. Voiding well.   She reports no breathing problems, headache or visual changes  She reports normal postpartum bleeding  Pain controlled with oral medications.     Vital Signs Last 24 Hrs  T(C): 36.9 (07 Sep 2019 09:06), Max: 36.9 (07 Sep 2019 09:06)  T(F): 98.4 (07 Sep 2019 09:06), Max: 98.4 (07 Sep 2019 09:06)  HR: 90 (07 Sep 2019 09:06) (72 - 90)  BP: 106/74 (07 Sep 2019 09:06) (90/52 - 106/74)  BP(mean): --  RR: 16 (07 Sep 2019 09:06) (16 - 16)  SpO2: 98% (07 Sep 2019 09:06) (98% - 98%)    Physical exam:  She generally looks and feels well but occasionally grimaces from gas pain.   No increased work of breathing.  Abdomen: Soft, mildly tender, mildly distended, firm uterine fundus, silver bandage on incision. Normoactive bowel sounds, low pitched in all 4 quadrants.   Pelvic: Normal lochia noted.  Ext: No DVT signs, warm extremities.    LABS:                        9.3    9.28  )-----------( 218      ( 06 Sep 2019 07:32 )             30.8                 Allergies    No Known Allergies    Intolerances She is a  34y  who is now post-operative day 2 from C section.     Subjective:  The patient is complaining of ongoing gas, but is passing a small amount of flatus. She states it's worse when lying down and nursing.   She is ambulating and tolerating a diet. She states she ate chicken fajita last night.   She is having  + flatus and is yet to have a BM. Voiding well.   She reports no breathing problems, headache or visual changes  She reports normal postpartum bleeding  Pain controlled with oral medications.     Vital Signs Last 24 Hrs  T(C): 36.9 (07 Sep 2019 09:06), Max: 36.9 (07 Sep 2019 09:06)  T(F): 98.4 (07 Sep 2019 09:06), Max: 98.4 (07 Sep 2019 09:06)  HR: 90 (07 Sep 2019 09:06) (72 - 90)  BP: 106/74 (07 Sep 2019 09:06) (90/52 - 106/74)  BP(mean): --  RR: 16 (07 Sep 2019 09:06) (16 - 16)  SpO2: 98% (07 Sep 2019 09:06) (98% - 98%)    Physical exam:  She generally looks and feels well but occasionally grimaces from gas pain.   No increased work of breathing.  Abdomen: Soft, mildly tender, mildly distended, firm uterine fundus, silver bandage on incision. Normoactive bowel sounds, low pitched in all 4 quadrants.   Pelvic: Normal lochia noted.  Ext: No DVT signs, warm extremities.    LABS:                        9.3    9.28  )-----------( 218      ( 06 Sep 2019 07:32 )             30.8                 Allergies    No Known Allergies    Intolerances

## 2019-09-07 NOTE — PROGRESS NOTE ADULT - ASSESSMENT
She is a  34y G who is now post-operative day 2 from C section tolerating well.    Plan:  -Will likely need glycerin suppository as patient states that works for her gas typically  -Continue simethicone, colace, MoM  -Showers as a mode of relaxation encouraged  -Ambulation encouraged for gas  -DC planning tomorrow She is a  34y  who is now post-operative day 2 from C section tolerating well.    Plan:  -Will likely need glycerin suppository as patient states that works for her gas typically  -Continue simethicone, colace, MoM  -Showers as a mode of relaxation encouraged  -Ambulation encouraged for gas  -DC planning tomorrow

## 2019-09-08 ENCOUNTER — TRANSCRIPTION ENCOUNTER (OUTPATIENT)
Age: 34
End: 2019-09-08

## 2019-09-08 VITALS — HEART RATE: 70 BPM

## 2019-09-08 RX ORDER — MULTIVIT WITH MIN/MFOLATE/K2 340-15/3 G
0.5 POWDER (GRAM) ORAL ONCE
Refills: 0 | Status: COMPLETED | OUTPATIENT
Start: 2019-09-08 | End: 2019-09-08

## 2019-09-08 RX ADMIN — Medication 600 MILLIGRAM(S): at 14:59

## 2019-09-08 RX ADMIN — Medication 600 MILLIGRAM(S): at 00:07

## 2019-09-08 RX ADMIN — Medication 975 MILLIGRAM(S): at 11:07

## 2019-09-08 RX ADMIN — Medication 0.5 BOTTLE: at 11:05

## 2019-09-08 RX ADMIN — SIMETHICONE 80 MILLIGRAM(S): 80 TABLET, CHEWABLE ORAL at 06:25

## 2019-09-08 RX ADMIN — Medication 975 MILLIGRAM(S): at 00:09

## 2019-09-08 RX ADMIN — Medication 100 MILLIGRAM(S): at 14:58

## 2019-09-08 RX ADMIN — Medication 100 MILLIGRAM(S): at 06:25

## 2019-09-08 RX ADMIN — Medication 975 MILLIGRAM(S): at 03:54

## 2019-09-08 RX ADMIN — Medication 600 MILLIGRAM(S): at 01:34

## 2019-09-08 RX ADMIN — Medication 600 MILLIGRAM(S): at 06:25

## 2019-09-08 RX ADMIN — Medication 600 MILLIGRAM(S): at 14:58

## 2019-09-08 RX ADMIN — Medication 975 MILLIGRAM(S): at 04:30

## 2019-09-08 NOTE — PROGRESS NOTE ADULT - SUBJECTIVE AND OBJECTIVE BOX
Postpartum Note,  Section  She is a  34y woman who is now post-operative day: #4    Subjective:  The patient feels well.  She is ambulating without difficulty.  She is tolerating PO.  She is voiding.  She denies nausea and vomiting.  Her pain is controlled.  She reports normal postpartum bleeding  She is breastfeeding.  She is formula feeding.    Physical exam:    Vital Signs Last 24 Hrs  T(C): 36.8 (08 Sep 2019 07:42), Max: 37.1 (07 Sep 2019 20:00)  T(F): 98.3 (08 Sep 2019 07:42), Max: 98.8 (07 Sep 2019 20:00)  HR: 70 (08 Sep 2019 07:55) (67 - 90)  BP: 108/67 (08 Sep 2019 07:42) (106/74 - 117/74)  BP(mean): --  RR: 16 (08 Sep 2019 07:42) (16 - 16)  SpO2: 100% (08 Sep 2019 07:42) (98% - 100%)    Gen: NAD  Breast: Soft, nontender, non engorged  Abdomen: Soft, nontender, no distension , firm uterine fundus at umbilicus.  Incision: Clean, dry, and intact with steri strips  Pelvic: Normal lochia noted  Ext: No calf tenderness    LABS:    blood type  Rubella status:     Allergies    No Known Allergies    Intolerances      MEDICATIONS  (STANDING):  acetaminophen   Tablet .. 975 milliGRAM(s) Oral <User Schedule>  diphtheria/tetanus/pertussis (acellular) Vaccine (ADAcel) 0.5 milliLiter(s) IntraMuscular once  ibuprofen  Tablet. 600 milliGRAM(s) Oral every 6 hours  lactated ringers. 1000 milliLiter(s) (125 mL/Hr) IV Continuous <Continuous>  morphine PF Spinal 0.2 milliGRAM(s) IntraThecal. once  oxytocin Infusion 333.333 milliUNIT(s)/Min (1000 mL/Hr) IV Continuous <Continuous>  oxytocin Infusion 333.333 milliUNIT(s)/Min (1000 mL/Hr) IV Continuous <Continuous>    MEDICATIONS  (PRN):  bisacodyl Suppository 10 milliGRAM(s) Rectal daily PRN Constipation  diphenhydrAMINE 25 milliGRAM(s) Oral every 6 hours PRN Itching  docusate sodium 100 milliGRAM(s) Oral two times a day PRN Stool softening  glycerin Suppository - Adult 1 Suppository(s) Rectal at bedtime PRN Constipation  HYDROmorphone  Injectable 1 milliGRAM(s) IV Push every 3 hours PRN Severe Pain (7 - 10)  lanolin Ointment 1 Application(s) Topical every 6 hours PRN Sore Nipples  magnesium hydroxide Suspension 30 milliLiter(s) Oral two times a day PRN Constipation  naloxone Injectable 0.1 milliGRAM(s) IV Push every 3 minutes PRN For ANY of the following changes in patient status:  A. RR LESS THAN 10 breaths per minute, B. Oxygen saturation LESS THAN 90%, C. Sedation score of 6  ondansetron Injectable 4 milliGRAM(s) IV Push every 6 hours PRN Nausea  oxyCODONE    IR 5 milliGRAM(s) Oral every 3 hours PRN Moderate to Severe Pain (4-10)  oxyCODONE    IR 5 milliGRAM(s) Oral once PRN Moderate to Severe Pain (4-10)  oxyCODONE    IR 5 milliGRAM(s) Oral every 3 hours PRN Mild Pain (1 - 3)  oxyCODONE    IR 10 milliGRAM(s) Oral every 3 hours PRN Moderate Pain (4 - 6)  simethicone 80 milliGRAM(s) Chew every 4 hours PRN Gas        Assessment and Plan  POD # 3 s/p RLFCS  Doing well.  Encourage ambulation.  Discharge home today.  F/U Wednesday for RENE dressing removal  F/U in 2 weeks for incision check.  Call for fevers, chills, nausea, vomiting, heavy vaginal bleeding, vaginal discharge, severe pain, symptoms of depression, problems with incision or any other concerning symptoms.  Nothing in vagina and no heavy lifting x 6 weeks.  No driving x 2 weeks.

## 2019-09-08 NOTE — DISCHARGE NOTE OB - PATIENT PORTAL LINK FT
You can access the FollowMyHealth Patient Portal offered by [facility] by registering at the following website: [email or web address]. By joining [employer]’s FollowMyHealth portal, you will also be able to view your health information using other applications (apps) compatible with our system.

## 2019-09-08 NOTE — DISCHARGE NOTE OB - CARE PLAN
Principal Discharge DX:	 delivery delivered  Goal:	Controlled pain  Assessment and plan of treatment:	-C section with pain controlled. Ambulating well

## 2019-09-08 NOTE — DISCHARGE NOTE OB - CARE PROVIDER_API CALL
Maurice Serna)  Obstetrics and Gynecology  [address] [place] [id number]  Phone: [phone]  Fax: [phone]  Follow Up Time:

## 2019-09-11 ENCOUNTER — APPOINTMENT (OUTPATIENT)
Dept: OBGYN | Facility: CLINIC | Age: 34
End: 2019-09-11
Payer: COMMERCIAL

## 2019-09-11 PROCEDURE — 0503F POSTPARTUM CARE VISIT: CPT

## 2019-09-11 NOTE — HISTORY OF PRESENT ILLNESS
[Postpartum Follow Up] : postpartum follow up [Repeat C/S] : delivered by  section (repeat) [Clean/Dry/Intact] : clean, dry and intact [Back to Normal] : is back to normal in size [Normal] : the vagina was normal [Mild] : mild vaginal bleeding [Not Done] : Examination of breasts not done [No Sign of Infection] : is showing no signs of infection [Doing Well] : is doing well [Excellent Pain Control] : has excellent pain control [None] : None [Complications:___] : no complications [S/Sx PP Depression] : no signs/symptoms of postpartum depression [Breastfeeding] : not currently nursing [Erythema] : not erythematous [Cervix Sample Taken] : cervical sample not taken for a Pap smear [de-identified] : followup in one week [de-identified] : Steri-Strips applied incision healing well

## 2019-09-16 DIAGNOSIS — Z30.2 ENCOUNTER FOR STERILIZATION: ICD-10-CM

## 2019-09-16 DIAGNOSIS — O34.211 MATERNAL CARE FOR LOW TRANSVERSE SCAR FROM PREVIOUS CESAREAN DELIVERY: ICD-10-CM

## 2019-09-16 DIAGNOSIS — Z3A.39 39 WEEKS GESTATION OF PREGNANCY: ICD-10-CM

## 2019-09-18 ENCOUNTER — APPOINTMENT (OUTPATIENT)
Dept: OBGYN | Facility: CLINIC | Age: 34
End: 2019-09-18
Payer: COMMERCIAL

## 2019-09-18 VITALS — TEMPERATURE: 98.1 F

## 2019-09-18 VITALS — SYSTOLIC BLOOD PRESSURE: 104 MMHG | RESPIRATION RATE: 16 BRPM | DIASTOLIC BLOOD PRESSURE: 64 MMHG

## 2019-09-18 PROCEDURE — 81003 URINALYSIS AUTO W/O SCOPE: CPT | Mod: QW

## 2019-09-18 PROCEDURE — 0503F POSTPARTUM CARE VISIT: CPT

## 2019-09-18 NOTE — HISTORY OF PRESENT ILLNESS
[Postpartum Follow Up] : postpartum follow up [Complications:___] : complications include: [unfilled] [Delivery Date: ___] : on [unfilled] [Female] : Delivery History: baby girl [Wt. ___] : weighing [unfilled] [Breastfeeding] : currently nursing [Resumed Menses] : has not resumed her menses [Resumed [place]] : has not resumed intercourse [Intended Contraception] : the patient does not intended to use contraception postpartum [Currently Experiencing] : The patient is currently experiencing symptoms. [Abdominal Pain] : abdominal pain [BreastFeeding Problems] : breastfeeding problems [Chest Pain] : no chest pain [Cracked Nipples] : no cracked nipples [S/Sx PP Depression] : no signs/symptoms of postpartum depression [Incisional Drainage] : no incisional drainage [Incisional Pain] : incisional pain [Irregular Bleeding] : no irregular bleeding [Shortness of Breath] : no shortness of breath [Leg Pain] : no leg pain [Suicidal Ideation] : no suicidal ideation [Clean/Dry/Intact] : clean, dry and intact [Swelling] : swollen [___ wks] : is [unfilled] weeks in size [None] : no vaginal bleeding [Doing Well] : is doing well [Healing Well] : is not healing well [No Sign of Infection] : is showing no signs of infection [Excellent Pain Control] : has excellent pain control [No Tampons/Suppositories] : against using tampons or vaginal suppositories [Limited ADLs] : to participate in activities of daily living with limitations [No Work] : not to work [No Housework] : not to do housework [No Sports] : not to participate in sports [de-identified] : left sided incisional  slight buldge noted, no redness, tenderness, fever, chills, or drainage.  [de-identified] : Pt doing well, concerned regarding past h/o hematoma, left sided pain more so than right, warm soaks advised, RTO in 4 weeks or prn.  [de-identified] : steri strips intact, use warm soaks to help dissolve seroma vs hematoma on left side,

## 2019-10-16 ENCOUNTER — APPOINTMENT (OUTPATIENT)
Dept: OBGYN | Facility: CLINIC | Age: 34
End: 2019-10-16
Payer: COMMERCIAL

## 2019-10-16 VITALS
HEIGHT: 65 IN | BODY MASS INDEX: 21.66 KG/M2 | DIASTOLIC BLOOD PRESSURE: 64 MMHG | SYSTOLIC BLOOD PRESSURE: 100 MMHG | WEIGHT: 130 LBS

## 2019-10-16 PROCEDURE — 0503F POSTPARTUM CARE VISIT: CPT | Mod: 25

## 2019-10-16 PROCEDURE — G0008: CPT

## 2019-10-16 PROCEDURE — 90686 IIV4 VACC NO PRSV 0.5 ML IM: CPT

## 2019-10-16 NOTE — HISTORY OF PRESENT ILLNESS
[Postpartum Follow Up] : postpartum follow up [Repeat C/S] : delivered by  section (repeat) [Breastfeeding] : currently nursing [Clean/Dry/Intact] : clean, dry and intact [Healed] : healed [Back to Normal] : is back to normal in size [Normal] : the vagina was normal [Not Done] : Examination of breasts not done [Doing Well] : is doing well [No Sign of Infection] : is showing no signs of infection [Excellent Pain Control] : has excellent pain control [None] : None [Complications:___] : no complications [BF with Difficulty] : nursing without difficulty [Erythema] : not erythematous [S/Sx PP Depression] : no signs/symptoms of postpartum depression [Cervix Sample Taken] : cervical sample not taken for a Pap smear [de-identified] : incision well-healed [de-identified] : followup in 6 months for routine annual exam and Pap smear. Instructions and precautions reviewed  vaccination given

## 2020-08-03 ENCOUNTER — APPOINTMENT (OUTPATIENT)
Dept: OBGYN | Facility: CLINIC | Age: 35
End: 2020-08-03
Payer: COMMERCIAL

## 2020-08-03 VITALS
WEIGHT: 124 LBS | SYSTOLIC BLOOD PRESSURE: 100 MMHG | BODY MASS INDEX: 20.66 KG/M2 | DIASTOLIC BLOOD PRESSURE: 60 MMHG | HEIGHT: 65 IN

## 2020-08-03 PROCEDURE — 99395 PREV VISIT EST AGE 18-39: CPT

## 2020-08-03 NOTE — CHIEF COMPLAINT
[Annual Visit] : annual visit [FreeTextEntry1] : Patient is a 34-year-old female presents for a routine annual gynecologic examination. Patient has no complaints.

## 2020-08-04 LAB — HPV HIGH+LOW RISK DNA PNL CVX: NOT DETECTED

## 2020-12-15 PROBLEM — Z87.09 HISTORY OF SORE THROAT: Status: RESOLVED | Noted: 2017-09-13 | Resolved: 2020-12-15

## 2020-12-15 PROBLEM — Z87.09 HISTORY OF ACUTE PHARYNGITIS: Status: RESOLVED | Noted: 2017-09-27 | Resolved: 2020-12-15

## 2020-12-15 PROBLEM — Z86.19 HISTORY OF CANDIDIASIS OF VAGINA: Status: RESOLVED | Noted: 2017-05-16 | Resolved: 2020-12-15

## 2020-12-15 PROBLEM — Z87.440 HISTORY OF URINARY TRACT INFECTION: Status: RESOLVED | Noted: 2017-06-09 | Resolved: 2020-12-15

## 2020-12-15 PROBLEM — J02.0 PHARYNGITIS DUE TO GROUP A BETA HEMOLYTIC STREPTOCOCCI: Status: RESOLVED | Noted: 2017-06-08 | Resolved: 2020-12-15

## 2021-02-14 ENCOUNTER — EMERGENCY (EMERGENCY)
Facility: HOSPITAL | Age: 36
LOS: 1 days | Discharge: HOME | End: 2021-02-14
Attending: EMERGENCY MEDICINE | Admitting: EMERGENCY MEDICINE
Payer: COMMERCIAL

## 2021-02-14 VITALS
OXYGEN SATURATION: 98 % | DIASTOLIC BLOOD PRESSURE: 70 MMHG | TEMPERATURE: 98 F | HEART RATE: 89 BPM | RESPIRATION RATE: 18 BRPM | SYSTOLIC BLOOD PRESSURE: 112 MMHG

## 2021-02-14 VITALS
OXYGEN SATURATION: 97 % | HEART RATE: 110 BPM | SYSTOLIC BLOOD PRESSURE: 134 MMHG | HEIGHT: 65 IN | DIASTOLIC BLOOD PRESSURE: 74 MMHG | RESPIRATION RATE: 18 BRPM | WEIGHT: 125 LBS | TEMPERATURE: 98 F

## 2021-02-14 DIAGNOSIS — Z98.891 HISTORY OF UTERINE SCAR FROM PREVIOUS SURGERY: Chronic | ICD-10-CM

## 2021-02-14 LAB — HCG SERPL-ACNC: <1 MIU/ML — SIGNIFICANT CHANGE UP

## 2021-02-14 PROCEDURE — 84702 CHORIONIC GONADOTROPIN TEST: CPT

## 2021-02-14 PROCEDURE — 99284 EMERGENCY DEPT VISIT MOD MDM: CPT

## 2021-02-14 PROCEDURE — 96375 TX/PRO/DX INJ NEW DRUG ADDON: CPT

## 2021-02-14 PROCEDURE — 99284 EMERGENCY DEPT VISIT MOD MDM: CPT | Mod: 25

## 2021-02-14 PROCEDURE — 96374 THER/PROPH/DIAG INJ IV PUSH: CPT

## 2021-02-14 PROCEDURE — 96361 HYDRATE IV INFUSION ADD-ON: CPT

## 2021-02-14 PROCEDURE — 36415 COLL VENOUS BLD VENIPUNCTURE: CPT

## 2021-02-14 RX ORDER — SUMATRIPTAN SUCCINATE 4 MG/.5ML
0 INJECTION, SOLUTION SUBCUTANEOUS
Qty: 0 | Refills: 0 | DISCHARGE

## 2021-02-14 RX ORDER — ELETRIPTAN HYDROBROMIDE 20 MG/1
0 TABLET, FILM COATED ORAL
Qty: 0 | Refills: 0 | DISCHARGE

## 2021-02-14 RX ORDER — DUPILUMAB 300 MG/2ML
0 INJECTION, SOLUTION SUBCUTANEOUS
Qty: 0 | Refills: 0 | DISCHARGE

## 2021-02-14 RX ORDER — RIMEGEPANT SULFATE 75 MG/75MG
1 TABLET, ORALLY DISINTEGRATING ORAL
Qty: 0 | Refills: 0 | DISCHARGE

## 2021-02-14 RX ORDER — SODIUM CHLORIDE 9 MG/ML
2000 INJECTION INTRAMUSCULAR; INTRAVENOUS; SUBCUTANEOUS ONCE
Refills: 0 | Status: COMPLETED | OUTPATIENT
Start: 2021-02-14 | End: 2021-02-14

## 2021-02-14 RX ORDER — DIPHENHYDRAMINE HCL 50 MG
25 CAPSULE ORAL ONCE
Refills: 0 | Status: COMPLETED | OUTPATIENT
Start: 2021-02-14 | End: 2021-02-14

## 2021-02-14 RX ORDER — ONDANSETRON 8 MG/1
1 TABLET, FILM COATED ORAL
Qty: 0 | Refills: 0 | DISCHARGE

## 2021-02-14 RX ORDER — METOCLOPRAMIDE HCL 10 MG
10 TABLET ORAL ONCE
Refills: 0 | Status: COMPLETED | OUTPATIENT
Start: 2021-02-14 | End: 2021-02-14

## 2021-02-14 RX ORDER — KETOROLAC TROMETHAMINE 30 MG/ML
15 SYRINGE (ML) INJECTION ONCE
Refills: 0 | Status: DISCONTINUED | OUTPATIENT
Start: 2021-02-14 | End: 2021-02-14

## 2021-02-14 RX ORDER — HYDROXYZINE HCL 10 MG
0 TABLET ORAL
Qty: 0 | Refills: 0 | DISCHARGE

## 2021-02-14 RX ADMIN — SODIUM CHLORIDE 2000 MILLILITER(S): 9 INJECTION INTRAMUSCULAR; INTRAVENOUS; SUBCUTANEOUS at 19:40

## 2021-02-14 RX ADMIN — Medication 25 MILLIGRAM(S): at 20:16

## 2021-02-14 RX ADMIN — Medication 10 MILLIGRAM(S): at 20:39

## 2021-02-14 RX ADMIN — SODIUM CHLORIDE 2000 MILLILITER(S): 9 INJECTION INTRAMUSCULAR; INTRAVENOUS; SUBCUTANEOUS at 20:40

## 2021-02-14 RX ADMIN — Medication 15 MILLIGRAM(S): at 21:20

## 2021-02-14 NOTE — ED ADULT NURSE NOTE - NS PRO PASSIVE SMOKE EXP
Spot Size: 7 mm Post-Procedure Care: Hydrocortisone 2.5% and Post care reviewed with patient Pulse Duration: 6 ms Location Override: full face and chest Pulse Duration: 3 ms Cryogen Time (Ms): 27 Delay Time (Ms): 2307 [facility] Cryogen Time (Ms): 98748 Otoniel Stack Fluence In J/Cm2 (Optional): 8 Detail Level: Zone Delay Time (Ms): 20 Consent: Written consent obtained, risks reviewed including but not limited to crusting, scabbing, blistering, scarring, darker or lighter pigmentary change, incidental hair removal, bruising, and/or incomplete removal. Immediate Endpoint: erythema Cryogen Time (Ms): 30 Pulse Duration: 10 ms Treated Area: medium area Spot Size: 10 mm Immediate Endpoint: purpura Treated Area: small area Post-Care Instructions: I reviewed with the patient in detail post-care instructions. Patient should stay away from the sun and wear sun protection until treated areas are fully healed. Fluence In J/Cm2 (Optional): 7 Laser Type: Vbeam 595nm No

## 2021-02-14 NOTE — ED PROVIDER NOTE - OBJECTIVE STATEMENT
Pt is a 34 yo female with pmhx of migraine c/o of throbbing occipital headache for a few days with associated nausea photophobia and phonophobia denies any blurry vision. Pt states headache typical of prior headaches. pt tested positive for covid last week denies any cough fever sob chest pain. Pt taking medications prescribed from neurologist at [facility] without relief. Pt called neuro and advised to come to er for fluids.

## 2021-02-14 NOTE — ED ADULT NURSE NOTE - BOWEL SOUNDS RUQ
Ventricular Rate : 73  Atrial Rate : 72  P-R Interval : 164  QRS Duration : 99  Q-T Interval : 379  QTC Calculation(Bezet) : 418  P Axis : 40  R Axis : 1  T Axis : 18  Diagnosis : Sinus rhythm    Confirmed by Daphney Barrow ([id number]) on 5/29/2019 1:10:27 PM  
present

## 2021-02-14 NOTE — ED PROVIDER NOTE - NEUROLOGICAL POSTURING
78M with hx of MI (2002, 2009, s/p stent x1), TIA (2010), HTN, BPH, gout, p/w intermittent 9/10 chest pressure and SOB x 2 months, worsening x 3 weeks. Worse with exertion and bending over, relieved by rest and alprazolam. Pt reports 1 episode of syncope several weeks ago after walking and then squatting down. Also reports productive cough x 1 month. Denies orthopnea, LE edema, HA, n/v. No history of CHF. No fevers, chills, AMS.  Troponin and BNP negative.  Seen by cardiology, TTE and NST ordered. normal

## 2021-02-14 NOTE — ED PROVIDER NOTE - ATTENDING CONTRIBUTION TO CARE
36 y/o F with hx of migraines with c/o migraine symptoms, Covid +. light noise sensitivity.     PE: well appearing, no distress  PERRL  heart/lungs wnl  neuro: wnl    migraine cocktail, IVFs, reassess

## 2021-02-14 NOTE — ED PROVIDER NOTE - PATIENT PORTAL LINK FT
You can access the FollowMyHealth Patient Portal offered by [facility] by registering at the following website: [email or web address]. By joining [employer]’s FollowMyHealth portal, you will also be able to view your health information using other applications (apps) compatible with our system.

## 2021-04-22 ENCOUNTER — APPOINTMENT (OUTPATIENT)
Dept: MAMMOGRAPHY | Facility: CLINIC | Age: 36
End: 2021-04-22

## 2021-04-22 ENCOUNTER — RESULT REVIEW (OUTPATIENT)
Age: 36
End: 2021-04-22

## 2021-04-22 ENCOUNTER — OUTPATIENT (OUTPATIENT)
Dept: OUTPATIENT SERVICES | Facility: HOSPITAL | Age: 36
LOS: 1 days | End: 2021-04-22
Payer: COMMERCIAL

## 2021-04-22 DIAGNOSIS — Z98.891 HISTORY OF UTERINE SCAR FROM PREVIOUS SURGERY: Chronic | ICD-10-CM

## 2021-04-22 DIAGNOSIS — Z01.419 ENCOUNTER FOR GYNECOLOGICAL EXAMINATION (GENERAL) (ROUTINE) WITHOUT ABNORMAL FINDINGS: ICD-10-CM

## 2021-04-22 PROCEDURE — 77067 SCR MAMMO BI INCL CAD: CPT

## 2021-04-22 PROCEDURE — 77067 SCR MAMMO BI INCL CAD: CPT | Mod: 26

## 2021-04-22 PROCEDURE — 77063 BREAST TOMOSYNTHESIS BI: CPT | Mod: 26

## 2021-04-22 PROCEDURE — 77063 BREAST TOMOSYNTHESIS BI: CPT

## 2021-05-02 NOTE — PROGRESS NOTE ADULT - PROBLEM SELECTOR PROBLEM 1
delivery delivered Vital Signs per nursing documentation  Gen: well appearing, no acute distress  HEENT: NCAT, MMM  Cardiac: regular rate rhythm, normal S1S2  Chest: clear to auscultation bilateral, no wheezes or crackles  Abdomen: soft, non tender non distended, no CVA tenderness  Extremity: no gross deformity, good perfusion  Skin: no rash  Neuro: nonfocal neuro exam, gait steady

## 2021-06-09 NOTE — PATIENT PROFILE OB - CAREGIVER ADDRESS
" [facility]   Mental Health and Addiction Care   [facility], [facility], [facility]    [phone] or [phone]   Master Plan    Patient  Name: Alicia Chase \"Jayme\"  MRN: [id number]   Counselor: GONZALO Cloud    Title: Dimension 1 Withdrawal Potential, Risk level 0, no concerns  Plan Date:  3/13/2017  Diagnosis:  Alcohol Use Disorder, Severe (F10.20)  Problem: Patient reports date of last use of alcohol on 2/9/17, most recent pattern of use has been 0.75 mL bottle daily. Patient reports withdrawal concerns were resolved while in inpatient treatment.     Goal: Begin Date: 3/13/2017 Target Date: 5/13/17 Date Completed:   Maintain abstinence throughout Treatment in order to avoid experiencing withdrawal symptoms and to meet treatment expectations.     Method 1: Begin Date:3/13/2017 Target Date: 5/13/17 Date Completed:   Attend groups as directed and share thoughts, feelings and urges to use, as well as sober supports with staff and peers in order to maintain awareness of details shaping your recovery process.      Title: Dimension 2 Biomedical condition, Risk level 1, mild concerns  Plan Date: 3/13/2017  Diagnosis: Alcohol Use Disorder, Severe  Problem: Patient reports medical concerns including chronic neck and back pain, carpal tunnel, hypothyroidism, anemia, and GERD which she reports is from having gastric bypass surgery in 2011. Patient reports that she does receive primary care through the [facility] in [facility].     Goal: Begin Date: 3/13/2017 Target Date: 5/13/17  Practice living a healthy lifestyle on a daily basis with proper rest, nutrition and exercise.     Method 1: Begin Date: 3/13/2017 Target Date: 5/13/17  Date Completed:   Develop healthy eating, sleep and exercise habits while attending treatment in order to feel better and become more alert and focused during the day. Discuss in daily check-in any positive changes or challenges in making health changes. "       Title: Dimension 3, Emotional/behavioral/cognitive condition/concerns, Risk level 2, moderate concerns  Plan Date:  3/13/2017  Diagnosis: Alcohol Use Disorder, Severe (F10.20)    Problem: Patient reports mental health diagnoses of PTSD and depression.  [facility] but [facility] in receiving [facility] health services at [facility]. Patient reports PTSD and grief are significant concerns for her.     Goal: Begin Date: 3/13/2017 Target Date: 5/13/17  To address mental health concerns, including grief,  to positive affect substance abuse recovery.     Method 1: Begin Date:3/13/2017 Target Date: 5/13/17 Date Completed:   Patient to pursue mental health services and report back to counselor/group benefits or challenges.     Method 2: Begin Date: 3/13/2017 Target Date: 5/13/17 Date Completed:   Patient to read handout on grief, and to journal at least 1x per week about how grief is currently affecting her ongoing recovery.       Title: Dimension 4, Treatment Acceptance/Resistance, Risk level 0, no concerns  Plan Date:  3/13/2017  Diagnosis: Alcohol Use Disorder, Severe (F10.20)  Problem: Patient identifies that her drinking was causing problems and is seeking services.     Goal: Begin Date: 3/13/2017 Target Date: 5/13/17  Patient to participate in group to support ongoing recovery and increase current motivation.     Method 1: Begin Date: 3/13/2017 Target Date: 5/13/17 Date Completed:   Participate in MICD groups as scheduled and let staff know if you are unable to attend groups due to other responsibilities         Title: Dimension 5, Relapse potential, Risk level 3, serious concerns  Plan Date:  3/13/2017  Diagnosis: Alcohol Use Disorder, Severe (F10.20)    Problem: Patient reports short periods of abstinence in the past, reports current abstinence since inpatient treatment. Patient reports history of treatment services in the past. Patient  reports some understanding of relapse prevention skills, but reports needing more support.      Goal: Begin Date: 3/13/2017 Target Date: 5/13/17   To deal effectively with relapse triggers and stressors while building coping skills in order to handle life events without resorting to drug/alcohol use.     Method 1: Begin Date: 3/13/2017 Target Date: 5/13/17  Date Completed:   Patient to participate in MICD group, identify additional coping skills that would benefit her recovery and share in daily check-in how she is utilizing these coping skills to prevent use.     Method 2: Begin Date: 3/13/2017 Target Date: 5/13/17 Date Completed:   Patient to identify at leat 5 higher risk situations/emotions for alcohol relapse, and identify how she hopes to handle these situations. Patient to share with counselor    Method 3: Begin Date: 3/13/2017 Target Date: 5/13/17 Date Completed:   Patient to identify 5 ways she determines her self worth, 5 ways alcohol use keeps her from being the person she wants to be, and 5 things that help foster more positive thinking.      Title: Dimension 6, Recovery Environment, Risk level 2, moderate concerns  Plan Date:  3/13/2017  Diagnosis: Alcohol Use Disorder, Severe (F10.20)  Problem:  Patient reports she has recently moved into a sober house. Patient reports limited daily activity. Patient reports her primary support are from her adult children and sister. Patient reports DWI in 2016, must complete treatment in order to get her license reinstated.     Goal: Begin Date: 3/13/2017 Target Date: 5/13/17  To have increased supportive relationships and activities to strengthen ongoing recovery     Method 1: Begin Date: 3/13/2017 Target Date: 5/13/17  Date Completed:   Patient to attend recovery support groups and discuss in daily check-in challenges or benefits to participation.     Method 2: Begin Date: 3/13/2017 Target Date: 5/13/17  Date Completed:   Patient to identify what her social  needs  are in recovery and identify 5 ways that she plans to meet these needs.     Method 3: Begin Date: 3/13/2017 Target Date: 5/13/17   Date Completed:   Patient to 3 goals for her that she would to start working on, and identify how she would like to begin working on these goals---share with counselor.         By signing this document, I am acknowledging that I was actively and directly involved in the development of my treatment plan.       Patient  Signature_________________________________________         Date__________________        Staff Signature  GONZALO Cloud     Date: 3/16/2017, 1:28 PM            [address]

## 2021-08-06 ENCOUNTER — APPOINTMENT (OUTPATIENT)
Dept: OBGYN | Facility: CLINIC | Age: 36
End: 2021-08-06
Payer: COMMERCIAL

## 2021-08-06 VITALS
SYSTOLIC BLOOD PRESSURE: 100 MMHG | BODY MASS INDEX: 20.49 KG/M2 | HEIGHT: 65 IN | DIASTOLIC BLOOD PRESSURE: 60 MMHG | WEIGHT: 123 LBS

## 2021-08-06 DIAGNOSIS — R92.2 INCONCLUSIVE MAMMOGRAM: ICD-10-CM

## 2021-08-06 DIAGNOSIS — N92.0 EXCESSIVE AND FREQUENT MENSTRUATION WITH REGULAR CYCLE: ICD-10-CM

## 2021-08-06 DIAGNOSIS — Z01.419 ENCOUNTER FOR GYNECOLOGICAL EXAMINATION (GENERAL) (ROUTINE) W/OUT ABNORMAL FINDINGS: ICD-10-CM

## 2021-08-06 PROCEDURE — 82270 OCCULT BLOOD FECES: CPT

## 2021-08-06 PROCEDURE — 99395 PREV VISIT EST AGE 18-39: CPT

## 2021-08-06 NOTE — HISTORY OF PRESENT ILLNESS
[FreeTextEntry1] : Patient is a 35-year-old female presents for a routine annual gynecologic exam. Patient states that the first 2 days of her period are very heavy. Discussed this issue with the patient recommend observation if patient continues and the patient finds this problematic recommend followup for workup and evaluation and then discuss treatment options. Patient's last mammogram was April 2021. Dense breast noted advised patient to have a breast ultrasound

## 2021-08-06 NOTE — PHYSICAL EXAM
[Appropriately responsive] : appropriately responsive [Alert] : alert [No Acute Distress] : no acute distress [No Lymphadenopathy] : no lymphadenopathy [Regular Rate Rhythm] : regular rate rhythm [No Murmurs] : no murmurs [Clear to Auscultation B/L] : clear to auscultation bilaterally [Soft] : soft [Non-tender] : non-tender [Non-distended] : non-distended [No HSM] : No HSM [No Lesions] : no lesions [No Mass] : no mass [Oriented x3] : oriented x3 [Examination Of The Breasts] : a normal appearance [No Masses] : no breast masses were palpable [Labia Majora] : normal [Labia Minora] : normal [Normal] : normal [Uterine Adnexae] : normal [FreeTextEntry5] : pap done [FreeTextEntry9] : Hemoccult negative

## 2021-08-08 LAB — HPV HIGH+LOW RISK DNA PNL CVX: NOT DETECTED

## 2021-09-10 NOTE — PATIENT PROFILE OB - AS SC BRADEN MOBILITY
Cosentyx Pregnancy And Lactation Text: This medication is Pregnancy Category B and is considered safe during pregnancy. It is unknown if this medication is excreted in breast milk. (4) no limitation

## 2022-05-09 ENCOUNTER — OUTPATIENT (OUTPATIENT)
Dept: OUTPATIENT SERVICES | Facility: HOSPITAL | Age: 37
LOS: 1 days | End: 2022-05-09
Payer: COMMERCIAL

## 2022-05-09 ENCOUNTER — APPOINTMENT (OUTPATIENT)
Dept: ULTRASOUND IMAGING | Facility: CLINIC | Age: 37
End: 2022-05-09
Payer: COMMERCIAL

## 2022-05-09 ENCOUNTER — RESULT REVIEW (OUTPATIENT)
Age: 37
End: 2022-05-09

## 2022-05-09 DIAGNOSIS — Z98.891 HISTORY OF UTERINE SCAR FROM PREVIOUS SURGERY: Chronic | ICD-10-CM

## 2022-05-09 DIAGNOSIS — Z00.8 ENCOUNTER FOR OTHER GENERAL EXAMINATION: ICD-10-CM

## 2022-05-09 PROCEDURE — 76641 ULTRASOUND BREAST COMPLETE: CPT | Mod: 26,50

## 2022-05-09 PROCEDURE — 76641 ULTRASOUND BREAST COMPLETE: CPT

## 2023-02-15 ENCOUNTER — APPOINTMENT (OUTPATIENT)
Dept: OBGYN | Facility: CLINIC | Age: 38
End: 2023-02-15

## 2023-02-28 NOTE — DISCHARGE NOTE OB - DRINK 8 TO 10 GLASSES OF FLUID EACH DAY
PAST MEDICAL HISTORY:  2019 novel coronavirus disease (COVID-19) july 2022    Gastric peptic ulcer hx of    Right subclavian vein thrombosis     Thoracic outlet syndrome       
Statement Selected

## 2023-03-02 ENCOUNTER — APPOINTMENT (OUTPATIENT)
Dept: OBGYN | Facility: CLINIC | Age: 38
End: 2023-03-02
Payer: COMMERCIAL

## 2023-03-02 VITALS
HEIGHT: 65 IN | DIASTOLIC BLOOD PRESSURE: 68 MMHG | WEIGHT: 124 LBS | BODY MASS INDEX: 20.66 KG/M2 | SYSTOLIC BLOOD PRESSURE: 102 MMHG

## 2023-03-02 DIAGNOSIS — Z3A.37 37 WEEKS GESTATION OF PREGNANCY: ICD-10-CM

## 2023-03-02 DIAGNOSIS — Z87.59 PERSONAL HISTORY OF OTHER COMPLICATIONS OF PREGNANCY, CHILDBIRTH AND THE PUERPERIUM: ICD-10-CM

## 2023-03-02 DIAGNOSIS — Z87.42 PERSONAL HISTORY OF OTHER DISEASES OF THE FEMALE GENITAL TRACT: ICD-10-CM

## 2023-03-02 DIAGNOSIS — Z98.890 OTHER SPECIFIED POSTPROCEDURAL STATES: ICD-10-CM

## 2023-03-02 DIAGNOSIS — Z12.39 ENCOUNTER FOR OTHER SCREENING FOR MALIGNANT NEOPLASM OF BREAST: ICD-10-CM

## 2023-03-02 DIAGNOSIS — Z34.82 ENCOUNTER FOR SUPERVISION OF OTHER NORMAL PREGNANCY, SECOND TRIMESTER: ICD-10-CM

## 2023-03-02 DIAGNOSIS — R92.2 INCONCLUSIVE MAMMOGRAM: ICD-10-CM

## 2023-03-02 DIAGNOSIS — O21.9 VOMITING OF PREGNANCY, UNSPECIFIED: ICD-10-CM

## 2023-03-02 DIAGNOSIS — Z34.81 ENCOUNTER FOR SUPERVISION OF OTHER NORMAL PREGNANCY, FIRST TRIMESTER: ICD-10-CM

## 2023-03-02 DIAGNOSIS — Z01.411 ENCOUNTER FOR GYNECOLOGICAL EXAMINATION (GENERAL) (ROUTINE) WITH ABNORMAL FINDINGS: ICD-10-CM

## 2023-03-02 DIAGNOSIS — Z34.90 ENCOUNTER FOR SUPERVISION OF NORMAL PREGNANCY, UNSPECIFIED, UNSPECIFIED TRIMESTER: ICD-10-CM

## 2023-03-02 DIAGNOSIS — Z3A.29 29 WEEKS GESTATION OF PREGNANCY: ICD-10-CM

## 2023-03-02 DIAGNOSIS — Z98.891 HISTORY OF UTERINE SCAR FROM PREVIOUS SURGERY: ICD-10-CM

## 2023-03-02 DIAGNOSIS — O98.511 OTHER VIRAL DISEASES COMPLICATING PREGNANCY, FIRST TRIMESTER: ICD-10-CM

## 2023-03-02 DIAGNOSIS — Z09 ENCOUNTER FOR FOLLOW-UP EXAMINATION AFTER COMPLETED TREATMENT FOR CONDITIONS OTHER THAN MALIGNANT NEOPLASM: ICD-10-CM

## 2023-03-02 DIAGNOSIS — O03.9 COMPLETE OR UNSPECIFIED SPONTANEOUS ABORTION W/OUT COMPLICATION: ICD-10-CM

## 2023-03-02 DIAGNOSIS — L76.34 POSTPROCEDURAL SEROMA OF SKIN AND SUBCUTANEOUS TISSUE FOLLOWING OTHER PROCEDURE: ICD-10-CM

## 2023-03-02 DIAGNOSIS — N94.9 UNSPECIFIED CONDITION ASSOCIATED WITH FEMALE GENITAL ORGANS AND MENSTRUAL CYCLE: ICD-10-CM

## 2023-03-02 DIAGNOSIS — B34.3 OTHER VIRAL DISEASES COMPLICATING PREGNANCY, FIRST TRIMESTER: ICD-10-CM

## 2023-03-02 DIAGNOSIS — Z34.83 ENCOUNTER FOR SUPERVISION OF OTHER NORMAL PREGNANCY, THIRD TRIMESTER: ICD-10-CM

## 2023-03-02 PROCEDURE — 99395 PREV VISIT EST AGE 18-39: CPT

## 2023-03-02 RX ORDER — AMOXICILLIN 500 MG/1
500 CAPSULE ORAL 3 TIMES DAILY
Qty: 21 | Refills: 0 | Status: DISCONTINUED | COMMUNITY
Start: 2017-09-13 | End: 2023-03-02

## 2023-03-02 RX ORDER — ASCORBIC ACID, CHOLECALCIFEROL, .ALPHA.-TOCOPHEROL, DL-, PYRIDOXINE HYDROCHLORIDE, FOLIC ACID, CYANOCOBALAMIN, CALCIUM CARBONATE, FERROUS FUMARATE, MAGNESIUM OXIDE AND DOCONEXENT 90; 220; 10; 26; 1; 13; 145; 28; 50; 300 MG/1; [IU]/1; [IU]/1; MG/1; MG/1; UG/1; MG/1; MG/1; MG/1; MG/1
28-0.6-0.4-3 CAPSULE, GELATIN COATED ORAL
Qty: 90 | Refills: 3 | Status: DISCONTINUED | COMMUNITY
End: 2023-03-02

## 2023-03-02 RX ORDER — AZITHROMYCIN 250 MG/1
250 TABLET, FILM COATED ORAL
Qty: 7 | Refills: 0 | Status: DISCONTINUED | COMMUNITY
Start: 2017-09-27 | End: 2023-03-02

## 2023-03-02 RX ORDER — ATOGEPANT 30 MG/1
30 TABLET ORAL
Refills: 0 | Status: ACTIVE | COMMUNITY

## 2023-03-02 RX ORDER — NORGESTIMATE AND ETHINYL ESTRADIOL 7DAYSX3 LO
0.18/0.215/0.25 KIT ORAL DAILY
Qty: 90 | Refills: 1 | Status: DISCONTINUED | COMMUNITY
Start: 2018-06-18 | End: 2023-03-02

## 2023-03-02 RX ORDER — NORETHINDRONE 0.35 MG/1
0.35 TABLET ORAL DAILY
Qty: 90 | Refills: 0 | Status: DISCONTINUED | COMMUNITY
Start: 2017-12-19 | End: 2023-03-02

## 2023-03-02 RX ORDER — DUPILUMAB 300 MG/2ML
300 INJECTION, SOLUTION SUBCUTANEOUS
Refills: 0 | Status: ACTIVE | COMMUNITY

## 2023-03-02 RX ORDER — OXYCODONE AND ACETAMINOPHEN 5; 325 MG/1; MG/1
5-325 TABLET ORAL
Qty: 20 | Refills: 0 | Status: DISCONTINUED | COMMUNITY
Start: 2017-11-04 | End: 2023-03-02

## 2023-03-02 NOTE — PHYSICAL EXAM
[Appropriately responsive] : appropriately responsive [Alert] : alert [No Acute Distress] : no acute distress [No Lymphadenopathy] : no lymphadenopathy [Oriented x3] : oriented x3 [Labia Majora] : normal [Labia Minora] : normal [Normal] : normal [Uterine Adnexae] : normal [FreeTextEntry6] : raised, edematous area to 12 aspect of areola - soft to touch; corresponds to patient's area of reported exzcema

## 2023-03-02 NOTE — DISCUSSION/SUMMARY
[FreeTextEntry1] : 1) pap performed\par 2) pt with no palpable findings on breast exam, only area of edema, tenderness to areola where she reports eczema. rx for diagnostic mammo and sono issued\par \par Return to office in one year, sooner prn

## 2023-03-02 NOTE — HISTORY OF PRESENT ILLNESS
[Currently Active] : currently active [Men] : men [TextBox_4] : Clair is a 36 y/o  who presentys today for an annual exam. She has c/o left breast tenderness since her last menses.  She is currently on Dupixent for eczema and eczema on left nipple is bothersome.   [Mammogramdate] : 4/22/21 [FreeTextEntry1] : 2/16/23 [FreeTextEntry3] : BTL

## 2023-03-03 LAB — HPV HIGH+LOW RISK DNA PNL CVX: NOT DETECTED

## 2023-03-06 LAB — CYTOLOGY CVX/VAG DOC THIN PREP: NORMAL

## 2023-03-13 ENCOUNTER — NON-APPOINTMENT (OUTPATIENT)
Age: 38
End: 2023-03-13

## 2023-03-13 ENCOUNTER — APPOINTMENT (OUTPATIENT)
Dept: MAMMOGRAPHY | Facility: CLINIC | Age: 38
End: 2023-03-13
Payer: COMMERCIAL

## 2023-03-13 ENCOUNTER — RESULT REVIEW (OUTPATIENT)
Age: 38
End: 2023-03-13

## 2023-03-13 ENCOUNTER — APPOINTMENT (OUTPATIENT)
Dept: ULTRASOUND IMAGING | Facility: CLINIC | Age: 38
End: 2023-03-13
Payer: COMMERCIAL

## 2023-03-13 PROCEDURE — 76641 ULTRASOUND BREAST COMPLETE: CPT | Mod: 50

## 2023-03-13 PROCEDURE — G0279: CPT

## 2023-03-13 PROCEDURE — 77066 DX MAMMO INCL CAD BI: CPT

## 2023-08-31 NOTE — H&P ADULT - PROBLEM SELECTOR PLAN 5
Bed in lowest position, wheels locked, appropriate side rails in place/Call bell, personal items and telephone in reach/Instruct patient to call for assistance before getting out of bed or chair/Non-slip footwear when patient is out of bed/[place] to call system/Physically safe environment - no spills, clutter or unnecessary equipment/Purposeful Proactive Rounding/Room/bathroom lighting operational, light cord in reach likely due to GI losses from vomiting  -- give 3 k riders stat  -- f/u BMP in AM -- venodynes for DVT PPx  -- ambulating

## 2023-09-25 NOTE — DISCHARGE NOTE OB - MEDICATION SUMMARY - MEDICATIONS TO CHANGE
JOSE sent requesting colonoscopy. Immunization's updated/triggered.   I will SWITCH the dose or number of times a day I take the medications listed below when I get home from the hospital:  None

## 2024-01-17 NOTE — PATIENT PROFILE ADULT. - ARE SIGNIFICANT INDICATORS COMPLETE.
[FreeTextEntry1] : Cardiology follow-up visit for evaluation management of persistent atrial fibrillation, anticoagulated, hypertension, hyperlipidemia, prior history of TIA.   Yes

## 2024-03-04 ENCOUNTER — APPOINTMENT (OUTPATIENT)
Dept: OBGYN | Facility: CLINIC | Age: 39
End: 2024-03-04
Payer: COMMERCIAL

## 2024-03-04 VITALS
SYSTOLIC BLOOD PRESSURE: 100 MMHG | HEIGHT: 65 IN | BODY MASS INDEX: 20.49 KG/M2 | WEIGHT: 123 LBS | DIASTOLIC BLOOD PRESSURE: 68 MMHG

## 2024-03-04 DIAGNOSIS — Z01.419 ENCOUNTER FOR GYNECOLOGICAL EXAMINATION (GENERAL) (ROUTINE) W/OUT ABNORMAL FINDINGS: ICD-10-CM

## 2024-03-04 DIAGNOSIS — N64.4 MASTODYNIA: ICD-10-CM

## 2024-03-04 DIAGNOSIS — Z12.4 ENCOUNTER FOR SCREENING FOR MALIGNANT NEOPLASM OF CERVIX: ICD-10-CM

## 2024-03-04 PROCEDURE — 99395 PREV VISIT EST AGE 18-39: CPT

## 2024-03-04 RX ORDER — ELETRIPTAN HYDROBROMIDE 20 MG/1
20 TABLET, FILM COATED ORAL
Qty: 6 | Refills: 0 | Status: DISCONTINUED | COMMUNITY
Start: 2017-01-24 | End: 2024-03-04

## 2024-03-04 NOTE — HISTORY OF PRESENT ILLNESS
[Currently Active] : currently active [Men] : men [TextBox_4] : Clair is a 39 y/o  who presents today for an annual exam  She had c/o last year of breast pain and had a normal mammo/jerman. Today she states the pain resolved for a few months. It restarted mid-cycle recently and then seems to resolve with menses/ She was advised to avoid excessive caffeine consumpton, take 800IU of Vitamin E daily and to wear a sports bra during times of pain.  She is sexually active s/p BTL [Mammogramdate] : 3/13/23 [FreeTextEntry1] : 2/23/24 [FreeTextEntry3] : BTL

## 2024-03-04 NOTE — PHYSICAL EXAM
[Appropriately responsive] : appropriately responsive [Alert] : alert [No Acute Distress] : no acute distress [Oriented x3] : oriented x3 [No Lymphadenopathy] : no lymphadenopathy [Examination Of The Breasts] : a normal appearance [No Discharge] : no discharge [No Masses] : no breast masses were palpable [Labia Majora] : normal [Labia Minora] : normal [Normal] : normal [Uterine Adnexae] : normal

## 2024-03-04 NOTE — DISCUSSION/SUMMARY
[FreeTextEntry1] : 1) pap performed 2) pt counseled on managment of breast pain. not at high risk for breast cancer, no family hx. begin annual breast screening at age 40  Return to office in one year, sooner prn

## 2024-03-06 LAB
CYTOLOGY CVX/VAG DOC THIN PREP: NORMAL
HPV HIGH+LOW RISK DNA PNL CVX: NOT DETECTED

## 2024-05-11 NOTE — ED ADULT NURSE NOTE - EENT ASSESSMENT, MLM
N/A Patient is under age 18 and does not have a history of high risk behavior or is not high risk for Hep C - - -

## 2025-01-03 NOTE — ED ADULT TRIAGE NOTE - BP NONINVASIVE DIASTOLIC (MM HG)
[facility]    Hospitalist Progress Note    Assessment & Plan   Date of Admission:  12/31/2024        Assessment & Plan  Cindi Padilla is a 33 year old female admitted on 12/31/2024 for evaluation of right leg pain, groin pain and lightheadedness. She has a past medical history of asthma and anxiety.       Sepsis  Severe /extensive right leg cellulitis suspect staph or strep  Ms. Padilla presents on 12/31/2024 for evaluation of right leg and groin pain as well as lightheadedness. She reports that her symptoms began at approximately 0640 on 12/30/2024. The pain has been radiating down to her knee. She exercised and did some stretching, but was unable to compete her workout due to lightheadedness. She endorses fever and when she awoke on 12/31/2024, she noticed that her right leg was red, swollen, painful and difficult to walk on. She has been nauseous and has vomited once. She reports that she previously had what she thought was an ingrown hair on her leg, which she treated and has since scabbed over. In ED, her heart rate is elevated at 120. She did receive a 1 liter fluid bolus in ED. Stat CT of her LE has been ordered and vancomycin has been ordered. Plan is as follows:   *Lactic acid 1.6  *WBC 21.0  *hcG:<1  *CK total: 55  *CT of the right leg:  No evidence for osteomyelitis, septic arthropathy, or abscess. Nonspecific edema or cellulitis within the subcutaneous soft tissues of the lower leg just above the ankle. Additional wispy edema or cellulitis within the anteromedial aspect of the proximal thigh where there is some reactive lymphadenopathy. No evidence for foreign body. No subcutaneous gas. Exam otherwise negative.  -Blood cultures obtained in ED-no growth to date  -Vancomycin stopped given MRSA nares negative.  Ceftriaxone changed to cefazolin.  -suspect staph or strep-likely strep.  CT imaging shows no clear deep infection.  There was some mild purulence at the site of where she  74 removed an ingrown hair.  -Given her SIRS and persistent leukocytosis clindamycin added to cefazolin to target toxin and its anti-inflammatory effects.  -White blood cell count trending down from 20 to10  -She has had steady improvement daily in her appearance of her rash and level of discomfort along with her leukocytosis.  Despite normalization of her white blood cell count she still has a large area of redness and induration therefore I think she needs another 24 hours of IV antibiotics  -Patient was needing oxycodone overnight but this was for headache which is now resolved not for her right lower extremity pain  -I suspect this is a strep cellulitis-severe    Plan-  -stop clindamycin  -continue cefazolin 2 g every 8 hours.   -Right lower extremity leg elevation.  Transition to compression stockings at discharge and the leg elevation at home   -Oxycodone 2.5-5 mg for moderate to severe pain   -Seen by physical therapy crutches provided if needed.  Patient does have ball at her heel which is tender with foot plant therefore may need crutches for several days.  -Needs another 24 hours of IV antibiotics.  Anticipate discharge home tomorrow-discharge orders completed  -pcp follow up 1/9/25       Acute kidney injury-resolved  prerenal. Cr down to 0.88 with fluid boluses and mant fluids.   Now taking po with improved appetite   Prn bmp     Asthma-  -no clear flare  -Resume Advair inhaler 1 puff every 12 hours  -Resume PTA albuterol 2 puffs q6h as needed for dyspnea and/or wheezing     Depression/anxiety  -Reports that she is no longer taking PTA sertraline  -Monitor for signs/symptoms of anxiety/depression        Diet: Regular diet  DVT Prophylaxis: Pneumatic Compression Devices  Farias Catheter: Not present  Lines: None     Cardiac Monitoring: None  Code Status:  Full code    DVT Prophylaxis: Patient ambulatory  Code Status: Full Code    Disposition: Dissipate discharge home  Medically Ready for Discharge: Anticipated  "Tomorrow pending continued improvement in right lower extremity cellulitic rash.  Clinically Significant Risk Factors                              # Overweight: Estimated body mass index is 26.15 kg/m  as calculated from the following:    Height as of this encounter: 1.676 m (5' 6\").    Weight as of this encounter: 73.5 kg (162 lb 0.6 oz)., PRESENT ON ADMISSION     # Asthma: noted on problem list        Moderate complex medical decision making.  Greater than 35 minutes spent on patient care including charting, chart review, exam, , care coordination with bedside nurse.  Discharge preparation      Thien Perez MD, MD  Text Page  (7am to 6pm)  Interval History   Feeling better. No nausea. Pain improved. Rash improved RLE  Appetite improved. Wbc down. No fevers overnight.     Up with PT with crutches today  -Data reviewed today: I reviewed all new labs and imaging results over the last 24 hours. I personally reviewed laboratory studies and imaging studies since admission    Physical Exam   Temp: 98.4  F (36.9  C) Temp src: Oral BP: 113/75 Pulse: 85   Resp: 16 SpO2: 98 % O2 Device: None (Room air)    Vitals:    12/31/24 0706 01/01/25 0621 01/02/25 0545   Weight: 69.9 kg (154 lb) 71.8 kg (158 lb 4.8 oz) 73.5 kg (162 lb 0.6 oz)     Vital Signs with Ranges  Temp:  [98  F (36.7  C)-98.7  F (37.1  C)] 98.4  F (36.9  C)  Pulse:  [] 85  Resp:  [15-16] 16  BP: (102-121)/(65-76) 113/75  SpO2:  [96 %-98 %] 98 %  No intake/output data recorded.    Constitutional: Nad, looks better today. Less flushed, smiling   Respiratory: CTAB  Cardiovascular: Regular rate and rhythm no rubs gallops or murmurs appreciated slightly tachycardic  GI: Soft nontender nondistended  Skin/Integumen: Right lower extremity cellulitic rash clearly improved again today.  Proximal area of redness markedly improved in intensity.  Confluent, circumferential right lower extremity rash below the knee continues to receded and less red in " intensity.  There has been some spread slightly caudally however in a different area of the rash.  -Also of note there is a 1 cm wide 4 cm long band blister with violaceous fluid over heel new in the last 24 hours-tender      Medications   Current Facility-Administered Medications   Medication Dose Route Frequency Provider Last Rate Last Admin     Current Facility-Administered Medications   Medication Dose Route Frequency Provider Last Rate Last Admin    albuterol (PROVENTIL HFA/VENTOLIN HFA) inhaler  2 puff Inhalation Q6H Tayo Keating NP        ceFAZolin (ANCEF) 2 g in 100 mL D5W intermittent infusion  2 g Intravenous Q8H Tayo Keating  mL/hr at 01/03/25 0838 2 g at 01/03/25 0838    fluticasone-vilanterol (BREO ELLIPTA) 100-25 MCG/ACT inhaler 1 puff  1 puff Inhalation Daily Tayo Keating NP        sennosides (SENOKOT) tablet 8.6 mg  8.6 mg Oral Daily Thien Perez MD           Data   Recent Labs   Lab 01/03/25  0840 01/02/25  0741 01/01/25  0848 12/31/24  0758   WBC 10.3 14.7* 20.9* 21.0*   HGB 10.9* 11.3* 12.0 13.1   MCV 88 88 88 87    202 217 230   NA  --  138 134* 135   POTASSIUM  --  3.8 3.6 4.0   CHLORIDE  --  106 102 100   CO2  --  23 20* 23   BUN  --  3.9* 9.3 15.2   CR  --  0.88 0.95 1.15*   ANIONGAP  --  9 12 12   CHRIS  --  8.4* 8.6* 9.2   GLC  --  109* 150* 113*   ALBUMIN  --   --   --  3.9   PROTTOTAL  --   --   --  7.4   BILITOTAL  --   --   --  0.5   ALKPHOS  --   --   --  67   ALT  --   --   --  11   AST  --   --   --  17       Imaging:   No results found for this or any previous visit (from the past 24 hours).

## 2025-09-04 ENCOUNTER — APPOINTMENT (OUTPATIENT)
Dept: OBGYN | Facility: CLINIC | Age: 40
End: 2025-09-04
Payer: COMMERCIAL

## 2025-09-04 ENCOUNTER — NON-APPOINTMENT (OUTPATIENT)
Age: 40
End: 2025-09-04

## 2025-09-04 VITALS
DIASTOLIC BLOOD PRESSURE: 70 MMHG | BODY MASS INDEX: 21.16 KG/M2 | WEIGHT: 127 LBS | SYSTOLIC BLOOD PRESSURE: 110 MMHG | HEIGHT: 65 IN

## 2025-09-04 DIAGNOSIS — R92.30 DENSE BREASTS, UNSPECIFIED: ICD-10-CM

## 2025-09-04 DIAGNOSIS — Z12.39 ENCOUNTER FOR OTHER SCREENING FOR MALIGNANT NEOPLASM OF BREAST: ICD-10-CM

## 2025-09-04 DIAGNOSIS — Z12.4 ENCOUNTER FOR SCREENING FOR MALIGNANT NEOPLASM OF CERVIX: ICD-10-CM

## 2025-09-04 DIAGNOSIS — Z30.011 ENCOUNTER FOR INITIAL PRESCRIPTION OF CONTRACEPTIVE PILLS: ICD-10-CM

## 2025-09-04 DIAGNOSIS — K64.8 OTHER HEMORRHOIDS: ICD-10-CM

## 2025-09-04 DIAGNOSIS — R53.81 OTHER MALAISE: ICD-10-CM

## 2025-09-04 DIAGNOSIS — Z01.411 ENCOUNTER FOR GYNECOLOGICAL EXAMINATION (GENERAL) (ROUTINE) WITH ABNORMAL FINDINGS: ICD-10-CM

## 2025-09-04 DIAGNOSIS — Z01.419 ENCOUNTER FOR GYNECOLOGICAL EXAMINATION (GENERAL) (ROUTINE) W/OUT ABNORMAL FINDINGS: ICD-10-CM

## 2025-09-04 DIAGNOSIS — R53.83 OTHER MALAISE: ICD-10-CM

## 2025-09-04 PROCEDURE — 99396 PREV VISIT EST AGE 40-64: CPT

## 2025-09-08 LAB
CYTOLOGY CVX/VAG DOC THIN PREP: ABNORMAL
HPV HIGH+LOW RISK DNA PNL CVX: NOT DETECTED

## 2025-09-15 ENCOUNTER — APPOINTMENT (OUTPATIENT)
Dept: ULTRASOUND IMAGING | Facility: CLINIC | Age: 40
End: 2025-09-15
Payer: COMMERCIAL

## 2025-09-15 ENCOUNTER — RESULT REVIEW (OUTPATIENT)
Age: 40
End: 2025-09-15

## 2025-09-15 ENCOUNTER — APPOINTMENT (OUTPATIENT)
Dept: MAMMOGRAPHY | Facility: CLINIC | Age: 40
End: 2025-09-15
Payer: COMMERCIAL

## 2025-09-15 DIAGNOSIS — Z91.89 OTHER SPECIFIED PERSONAL RISK FACTORS, NOT ELSEWHERE CLASSIFIED: ICD-10-CM

## 2025-09-15 PROCEDURE — 76641 ULTRASOUND BREAST COMPLETE: CPT | Mod: 50

## 2025-09-15 PROCEDURE — 77067 SCR MAMMO BI INCL CAD: CPT

## 2025-09-15 PROCEDURE — 77063 BREAST TOMOSYNTHESIS BI: CPT

## 2025-09-17 ENCOUNTER — APPOINTMENT (OUTPATIENT)
Dept: COLORECTAL SURGERY | Facility: CLINIC | Age: 40
End: 2025-09-17